# Patient Record
Sex: MALE | Race: WHITE | NOT HISPANIC OR LATINO | Employment: UNEMPLOYED | RURAL
[De-identification: names, ages, dates, MRNs, and addresses within clinical notes are randomized per-mention and may not be internally consistent; named-entity substitution may affect disease eponyms.]

---

## 2020-11-04 ENCOUNTER — HISTORICAL (OUTPATIENT)
Dept: ADMINISTRATIVE | Facility: HOSPITAL | Age: 60
End: 2020-11-04

## 2021-03-30 PROBLEM — J44.9 COPD (CHRONIC OBSTRUCTIVE PULMONARY DISEASE): Status: ACTIVE | Noted: 2021-03-30

## 2021-03-30 PROBLEM — M19.90 DJD (DEGENERATIVE JOINT DISEASE): Status: ACTIVE | Noted: 2021-03-30

## 2021-03-30 PROBLEM — I10 HTN (HYPERTENSION): Status: ACTIVE | Noted: 2021-03-30

## 2021-09-08 RX ORDER — MELOXICAM 7.5 MG/1
7.5 TABLET ORAL DAILY
Qty: 90 TABLET | Refills: 3 | Status: SHIPPED | OUTPATIENT
Start: 2021-09-08 | End: 2022-07-10 | Stop reason: SDUPTHER

## 2021-09-08 RX ORDER — ALBUTEROL SULFATE 90 UG/1
2 AEROSOL, METERED RESPIRATORY (INHALATION) 4 TIMES DAILY
Qty: 18 G | Refills: 3 | Status: SHIPPED | OUTPATIENT
Start: 2021-09-08 | End: 2022-07-10 | Stop reason: SDUPTHER

## 2021-09-08 RX ORDER — BISOPROLOL FUMARATE AND HYDROCHLOROTHIAZIDE 5; 6.25 MG/1; MG/1
1 TABLET ORAL DAILY
Qty: 90 TABLET | Refills: 3 | Status: SHIPPED | OUTPATIENT
Start: 2021-09-08 | End: 2022-08-08 | Stop reason: SDUPTHER

## 2021-09-08 RX ORDER — CITALOPRAM 40 MG/1
40 TABLET, FILM COATED ORAL DAILY
Qty: 90 TABLET | Refills: 3 | Status: SHIPPED | OUTPATIENT
Start: 2021-09-08 | End: 2022-11-09

## 2021-09-08 RX ORDER — FLUTICASONE PROPIONATE 220 UG/1
2 AEROSOL, METERED RESPIRATORY (INHALATION) 2 TIMES DAILY
Qty: 12 G | Refills: 3 | Status: SHIPPED | OUTPATIENT
Start: 2021-09-08 | End: 2022-11-09

## 2021-09-08 RX ORDER — AMLODIPINE AND BENAZEPRIL HYDROCHLORIDE 5; 20 MG/1; MG/1
1 CAPSULE ORAL 2 TIMES DAILY
Qty: 180 CAPSULE | Refills: 3 | Status: SHIPPED | OUTPATIENT
Start: 2021-09-08 | End: 2022-08-08 | Stop reason: SDUPTHER

## 2021-09-08 RX ORDER — ASPIRIN 81 MG/1
81 TABLET ORAL DAILY
Qty: 90 TABLET | Refills: 3 | Status: SHIPPED | OUTPATIENT
Start: 2021-09-08 | End: 2022-07-10 | Stop reason: SDUPTHER

## 2021-09-08 RX ORDER — ALLOPURINOL 300 MG/1
300 TABLET ORAL DAILY
Qty: 90 TABLET | Refills: 3 | Status: SHIPPED | OUTPATIENT
Start: 2021-09-08 | End: 2022-07-10 | Stop reason: SDUPTHER

## 2022-07-11 RX ORDER — ALBUTEROL SULFATE 90 UG/1
2 AEROSOL, METERED RESPIRATORY (INHALATION) 4 TIMES DAILY
Qty: 18 G | Refills: 3 | Status: SHIPPED | OUTPATIENT
Start: 2022-07-11 | End: 2023-03-20 | Stop reason: SDUPTHER

## 2022-07-11 RX ORDER — ASPIRIN 81 MG/1
81 TABLET ORAL DAILY
Qty: 90 TABLET | Refills: 3 | Status: SHIPPED | OUTPATIENT
Start: 2022-07-11 | End: 2023-10-10 | Stop reason: SDUPTHER

## 2022-07-11 RX ORDER — MELOXICAM 7.5 MG/1
7.5 TABLET ORAL DAILY
Qty: 90 TABLET | Refills: 3 | Status: SHIPPED | OUTPATIENT
Start: 2022-07-11 | End: 2023-07-25 | Stop reason: SDUPTHER

## 2022-08-08 RX ORDER — AMLODIPINE AND BENAZEPRIL HYDROCHLORIDE 5; 20 MG/1; MG/1
1 CAPSULE ORAL 2 TIMES DAILY
Qty: 180 CAPSULE | Refills: 3 | Status: SHIPPED | OUTPATIENT
Start: 2022-08-08 | End: 2023-07-25 | Stop reason: SDUPTHER

## 2022-08-08 RX ORDER — BISOPROLOL FUMARATE AND HYDROCHLOROTHIAZIDE 5; 6.25 MG/1; MG/1
1 TABLET ORAL DAILY
Qty: 90 TABLET | Refills: 3 | Status: SHIPPED | OUTPATIENT
Start: 2022-08-08 | End: 2023-07-25 | Stop reason: SDUPTHER

## 2022-11-09 ENCOUNTER — OFFICE VISIT (OUTPATIENT)
Dept: PRIMARY CARE CLINIC | Facility: CLINIC | Age: 62
End: 2022-11-09
Payer: COMMERCIAL

## 2022-11-09 ENCOUNTER — TELEPHONE (OUTPATIENT)
Dept: PRIMARY CARE CLINIC | Facility: CLINIC | Age: 62
End: 2022-11-09
Payer: COMMERCIAL

## 2022-11-09 VITALS
SYSTOLIC BLOOD PRESSURE: 134 MMHG | DIASTOLIC BLOOD PRESSURE: 80 MMHG | RESPIRATION RATE: 18 BRPM | BODY MASS INDEX: 40.69 KG/M2 | OXYGEN SATURATION: 95 % | WEIGHT: 307 LBS | HEIGHT: 73 IN | HEART RATE: 56 BPM

## 2022-11-09 DIAGNOSIS — Z23 NEED FOR VACCINATION: ICD-10-CM

## 2022-11-09 DIAGNOSIS — Z12.11 COLON CANCER SCREENING: ICD-10-CM

## 2022-11-09 DIAGNOSIS — Z12.2 SCREENING FOR LUNG CANCER: ICD-10-CM

## 2022-11-09 DIAGNOSIS — Z00.00 ROUTINE GENERAL MEDICAL EXAMINATION AT A HEALTH CARE FACILITY: Primary | ICD-10-CM

## 2022-11-09 DIAGNOSIS — J44.9 CHRONIC OBSTRUCTIVE PULMONARY DISEASE, UNSPECIFIED COPD TYPE: ICD-10-CM

## 2022-11-09 DIAGNOSIS — I10 HTN (HYPERTENSION), BENIGN: Primary | ICD-10-CM

## 2022-11-09 DIAGNOSIS — Z12.5 PROSTATE CANCER SCREENING: ICD-10-CM

## 2022-11-09 DIAGNOSIS — I10 HYPERTENSION, UNSPECIFIED TYPE: ICD-10-CM

## 2022-11-09 PROCEDURE — 90715 TDAP VACCINE GREATER THAN OR EQUAL TO 7YO IM: ICD-10-PCS | Mod: ICN,,, | Performed by: FAMILY MEDICINE

## 2022-11-09 PROCEDURE — 90472 IMMUNIZATION ADMIN EACH ADD: CPT | Mod: ICN,,, | Performed by: FAMILY MEDICINE

## 2022-11-09 PROCEDURE — 99396 PREV VISIT EST AGE 40-64: CPT | Mod: 25,ICN,, | Performed by: FAMILY MEDICINE

## 2022-11-09 PROCEDURE — 90686 IIV4 VACC NO PRSV 0.5 ML IM: CPT | Mod: ICN,,, | Performed by: FAMILY MEDICINE

## 2022-11-09 PROCEDURE — 90472 PNEUMOCOCCAL CONJUGATE VACCINE 20-VALENT: ICD-10-PCS | Mod: ICN,,, | Performed by: FAMILY MEDICINE

## 2022-11-09 PROCEDURE — 90471 IMMUNIZATION ADMIN: CPT | Mod: ICN,,, | Performed by: FAMILY MEDICINE

## 2022-11-09 PROCEDURE — 3079F PR MOST RECENT DIASTOLIC BLOOD PRESSURE 80-89 MM HG: ICD-10-PCS | Mod: ICN,,, | Performed by: FAMILY MEDICINE

## 2022-11-09 PROCEDURE — 4010F PR ACE/ARB THEARPY RXD/TAKEN: ICD-10-PCS | Mod: ICN,,, | Performed by: FAMILY MEDICINE

## 2022-11-09 PROCEDURE — 90677 PNEUMOCOCCAL CONJUGATE VACCINE 20-VALENT: ICD-10-PCS | Mod: ICN,,, | Performed by: FAMILY MEDICINE

## 2022-11-09 PROCEDURE — 3008F BODY MASS INDEX DOCD: CPT | Mod: ICN,,, | Performed by: FAMILY MEDICINE

## 2022-11-09 PROCEDURE — 3075F SYST BP GE 130 - 139MM HG: CPT | Mod: ICN,,, | Performed by: FAMILY MEDICINE

## 2022-11-09 PROCEDURE — 90471 FLU VACCINE (QUAD) GREATER THAN OR EQUAL TO 3YO PRESERVATIVE FREE IM: ICD-10-PCS | Mod: ICN,,, | Performed by: FAMILY MEDICINE

## 2022-11-09 PROCEDURE — 90715 TDAP VACCINE 7 YRS/> IM: CPT | Mod: ICN,,, | Performed by: FAMILY MEDICINE

## 2022-11-09 PROCEDURE — 99396 FLU VACCINE (QUAD) GREATER THAN OR EQUAL TO 3YO PRESERVATIVE FREE IM: ICD-10-PCS | Mod: 25,ICN,, | Performed by: FAMILY MEDICINE

## 2022-11-09 PROCEDURE — 1159F PR MEDICATION LIST DOCUMENTED IN MEDICAL RECORD: ICD-10-PCS | Mod: ICN,,, | Performed by: FAMILY MEDICINE

## 2022-11-09 PROCEDURE — 1159F MED LIST DOCD IN RCRD: CPT | Mod: ICN,,, | Performed by: FAMILY MEDICINE

## 2022-11-09 PROCEDURE — 3075F PR MOST RECENT SYSTOLIC BLOOD PRESS GE 130-139MM HG: ICD-10-PCS | Mod: ICN,,, | Performed by: FAMILY MEDICINE

## 2022-11-09 PROCEDURE — 90677 PCV20 VACCINE IM: CPT | Mod: ICN,,, | Performed by: FAMILY MEDICINE

## 2022-11-09 PROCEDURE — 4010F ACE/ARB THERAPY RXD/TAKEN: CPT | Mod: ICN,,, | Performed by: FAMILY MEDICINE

## 2022-11-09 PROCEDURE — 3079F DIAST BP 80-89 MM HG: CPT | Mod: ICN,,, | Performed by: FAMILY MEDICINE

## 2022-11-09 PROCEDURE — 90686 PR FLU VACCINE, QIIV4, NO PRSV, 0.5 ML, IM: ICD-10-PCS | Mod: ICN,,, | Performed by: FAMILY MEDICINE

## 2022-11-09 PROCEDURE — 3008F PR BODY MASS INDEX (BMI) DOCUMENTED: ICD-10-PCS | Mod: ICN,,, | Performed by: FAMILY MEDICINE

## 2022-11-09 RX ORDER — CETIRIZINE HYDROCHLORIDE 10 MG/1
10 TABLET ORAL DAILY
COMMUNITY

## 2022-11-09 RX ORDER — CHOLECALCIFEROL (VITAMIN D3) 25 MCG
1000 TABLET ORAL DAILY
COMMUNITY

## 2022-11-09 NOTE — PROGRESS NOTES
Subjective:      Patient ID: Camilo Olivera is a 61 y.o. male.    Chief Complaint: Follow-up    Camilo Olivera a 61 y.o. male presents for follow up on all regular problems which are reviewed and discussed.     Problem List Items Addressed This Visit          Pulmonary    COPD (chronic obstructive pulmonary disease)       Cardiac/Vascular    HTN (hypertension)       ID    Need for vaccination       Other    Routine general medical examination at a health care facility - Primary       Past Medical History:  History reviewed. No pertinent past medical history.  Past Surgical History:   Procedure Laterality Date    HERNIA REPAIR  1990's    Umbilical     Review of patient's allergies indicates:  No Known Allergies  Current Outpatient Medications on File Prior to Visit   Medication Sig Dispense Refill    albuterol (PROVENTIL/VENTOLIN HFA) 90 mcg/actuation inhaler Inhale 2 puffs into the lungs 4 (four) times daily. Rescue 18 g 3    allopurinoL (ZYLOPRIM) 300 MG tablet Take 1 tablet (300 mg total) by mouth once daily. 90 tablet 3    amlodipine-benazepril 5-20 mg (LOTREL) 5-20 mg per capsule Take 1 capsule by mouth 2 (two) times a day. 180 capsule 3    aspirin (ECOTRIN) 81 MG EC tablet Take 1 tablet (81 mg total) by mouth once daily. 90 tablet 3    beta-carotene,A,-vits C,E/mins (OCUVITE ORAL) Take by mouth.      bisoprolol-hydrochlorothiazide 5-6.25 mg (ZIAC) 5-6.25 mg Tab Take 1 tablet by mouth once daily. 90 tablet 3    cetirizine (ZYRTEC) 10 MG tablet Take 10 mg by mouth once daily.      folic acid/multivit-min/lutein (CENTRUM SILVER ORAL) Take 1 tablet by mouth Daily.      meloxicam (MOBIC) 7.5 MG tablet Take 1 tablet (7.5 mg total) by mouth once daily. 90 tablet 3    vitamin D (VITAMIN D3) 1000 units Tab Take 1,000 Units by mouth once daily.      [DISCONTINUED] allopurinoL (ZYLOPRIM) 300 MG tablet Take 300 mg by mouth once daily.      [DISCONTINUED] citalopram (CELEXA) 40 MG tablet Take 1 tablet (40 mg total)  by mouth once daily. 90 tablet 3    [DISCONTINUED] fluticasone propionate (FLOVENT HFA) 220 mcg/actuation inhaler Inhale 2 puffs into the lungs 2 (two) times daily. Controller 12 g 3     No current facility-administered medications on file prior to visit.     Social History     Socioeconomic History    Marital status:    Tobacco Use    Smoking status: Former     Packs/day: 1.00     Years: 40.00     Pack years: 40.00     Types: Cigarettes, Vaping with nicotine     Start date: 1980     Quit date: 9/10/2022     Years since quittin.1    Smokeless tobacco: Never    Tobacco comments:     I  like  smoking   Substance and Sexual Activity    Alcohol use: Yes     Alcohol/week: 12.0 standard drinks     Types: 12 Drinks containing 0.5 oz of alcohol per week    Drug use: Never    Sexual activity: Yes     Partners: Female     Birth control/protection: None     Family History   Problem Relation Age of Onset    Hypertension Mother     Cancer Maternal Grandmother         Breast    Arthritis Maternal Aunt         Hands    COPD Paternal Uncle     Heart disease Brother         Heart valve    Vision loss Paternal Aunt         Glocoma       Review of Systems   Constitutional:  Negative for activity change and unexpected weight change.   HENT:  Negative for hearing loss, rhinorrhea and trouble swallowing.    Eyes:  Negative for discharge and visual disturbance.   Respiratory:  Positive for wheezing. Negative for chest tightness.    Cardiovascular:  Negative for chest pain and palpitations.   Gastrointestinal:  Negative for blood in stool, constipation, diarrhea and vomiting.   Endocrine: Negative for polydipsia and polyuria.   Genitourinary:  Positive for urgency. Negative for difficulty urinating and hematuria.   Musculoskeletal:  Positive for arthralgias, joint swelling and neck pain.   Neurological:  Negative for weakness and headaches.   Psychiatric/Behavioral:  Negative for confusion and dysphoric mood.      Objective:  "    /80 (BP Location: Left arm, Patient Position: Sitting, BP Method: Large (Manual))   Pulse (!) 56   Resp 18   Ht 6' 1" (1.854 m)   Wt (!) 139.3 kg (307 lb)   SpO2 95%   BMI 40.50 kg/m²     Physical Exam  Constitutional:       Appearance: Normal appearance. He is obese.   HENT:      Head: Normocephalic and atraumatic.      Right Ear: External ear normal.      Left Ear: External ear normal.      Nose: Nose normal.      Mouth/Throat:      Mouth: Mucous membranes are moist.      Pharynx: Oropharynx is clear.   Eyes:      Pupils: Pupils are equal, round, and reactive to light.   Cardiovascular:      Rate and Rhythm: Normal rate and regular rhythm.      Heart sounds: Normal heart sounds.   Pulmonary:      Effort: Pulmonary effort is normal. No respiratory distress.      Breath sounds: Normal breath sounds. No stridor. No rhonchi or rales.   Abdominal:      Palpations: Abdomen is soft.   Musculoskeletal:         General: Normal range of motion.      Cervical back: Normal range of motion and neck supple.   Skin:     General: Skin is warm and dry.   Neurological:      General: No focal deficit present.      Mental Status: He is alert.   Psychiatric:         Mood and Affect: Mood normal.         Behavior: Behavior normal.         Thought Content: Thought content normal.         Judgment: Judgment normal.       1. Routine general medical examination at a health care facility    2. Colon cancer screening    3. Screening for lung cancer    4. Chronic obstructive pulmonary disease, unspecified COPD type    5. Hypertension, unspecified type    6. Need for vaccination        Plan:     Problem List Items Addressed This Visit          Pulmonary    COPD (chronic obstructive pulmonary disease)       Cardiac/Vascular    HTN (hypertension)       ID    Need for vaccination       Other    Routine general medical examination at a health care facility - Primary     No follow-ups on file.  Screens set up shots fu etc    I am " having Camilo Olivera maintain his albuterol, aspirin, meloxicam, allopurinoL, amlodipine-benazepril 5-20 mg, bisoprolol-hydrochlorothiazide 5-6.25 mg, vitamin D, folic acid/multivit-min/lutein (CENTRUM SILVER ORAL), (beta-carotene,A,-vits C,E/mins (OCUVITE ORAL)), and cetirizine.    Camilo was seen today for follow-up.    Diagnoses and all orders for this visit:    Routine general medical examination at a health care facility    Colon cancer screening  -     Cologuard Screening (Multitarget Stool DNA); Future  -     Cologuard Screening (Multitarget Stool DNA)    Screening for lung cancer  -     CT Chest Lung Screening Low Dose; Future    Chronic obstructive pulmonary disease, unspecified COPD type    Hypertension, unspecified type    Need for vaccination  -     Influenza - Quadrivalent *Preferred* (6 months+) (PF)  -     (In Office Administered) Pneumococcal Conjugate Vaccine (20 Valent) (IM)  -     (In Office Administered) Tdap Vaccine       [unfilled]  Orders Placed This Encounter   Procedures    Cologuard Screening (Multitarget Stool DNA)     Standing Status:   Future     Number of Occurrences:   1     Standing Expiration Date:   1/8/2024    CT Chest Lung Screening Low Dose     Standing Status:   Future     Standing Expiration Date:   11/9/2023     Order Specific Question:   Is there documentation of shared decision making for this lung screening exam?     Answer:   Yes     Order Specific Question:   Is the patient a current smoker?     Answer:   Yes     Order Specific Question:   Is the patient a current or former smoker who quit within the past 15 years?     Answer:   Yes     Order Specific Question:   Is the patient between the age 50-80 years old?     Answer:   Yes     Order Specific Question:   Has the patient smoked 20 or more packs of cigarettes/year?     Answer:   Yes     Order Specific Question:   Does the patient show any signs or symptoms of lung cancer?     Answer:   Yes     Order Specific  Question:   May the Radiologist modify the order per protocol to meet the clinical needs of the patient?     Answer:   Yes     Order Specific Question:   Is this a low dose screening chest CT?     Answer:   Yes     Order Specific Question:   Is this a low dose screening chest CT?     Answer:   Yes    Influenza - Quadrivalent *Preferred* (6 months+) (PF)    (In Office Administered) Pneumococcal Conjugate Vaccine (20 Valent) (IM)    (In Office Administered) Tdap Vaccine

## 2022-11-09 NOTE — TELEPHONE ENCOUNTER
----- Message from Alex Melendez III, DO sent at 11/9/2022  3:36 PM CST -----  Labs good except good chol. Is low. Exercise lose wt

## 2022-11-16 ENCOUNTER — HOSPITAL ENCOUNTER (OUTPATIENT)
Dept: RADIOLOGY | Facility: HOSPITAL | Age: 62
Discharge: HOME | End: 2022-11-16
Attending: FAMILY MEDICINE
Payer: COMMERCIAL

## 2022-11-16 ENCOUNTER — OFFICE VISIT (OUTPATIENT)
Dept: PRIMARY CARE CLINIC | Facility: CLINIC | Age: 62
End: 2022-11-16
Payer: COMMERCIAL

## 2022-11-16 VITALS — WEIGHT: 307 LBS | HEIGHT: 73 IN | BODY MASS INDEX: 40.69 KG/M2

## 2022-11-16 VITALS
HEART RATE: 58 BPM | BODY MASS INDEX: 40.56 KG/M2 | HEIGHT: 73 IN | RESPIRATION RATE: 18 BRPM | OXYGEN SATURATION: 95 % | WEIGHT: 306 LBS | DIASTOLIC BLOOD PRESSURE: 74 MMHG | SYSTOLIC BLOOD PRESSURE: 122 MMHG

## 2022-11-16 DIAGNOSIS — Z12.2 SCREENING FOR LUNG CANCER: ICD-10-CM

## 2022-11-16 DIAGNOSIS — J44.9 CHRONIC OBSTRUCTIVE PULMONARY DISEASE, UNSPECIFIED COPD TYPE: Primary | ICD-10-CM

## 2022-11-16 DIAGNOSIS — I10 HYPERTENSION, UNSPECIFIED TYPE: ICD-10-CM

## 2022-11-16 DIAGNOSIS — M19.90 OSTEOARTHRITIS, UNSPECIFIED OSTEOARTHRITIS TYPE, UNSPECIFIED SITE: ICD-10-CM

## 2022-11-16 PROCEDURE — 3074F PR MOST RECENT SYSTOLIC BLOOD PRESSURE < 130 MM HG: ICD-10-PCS | Mod: ,,, | Performed by: FAMILY MEDICINE

## 2022-11-16 PROCEDURE — 4010F ACE/ARB THERAPY RXD/TAKEN: CPT | Mod: ,,, | Performed by: FAMILY MEDICINE

## 2022-11-16 PROCEDURE — 3008F BODY MASS INDEX DOCD: CPT | Mod: ,,, | Performed by: FAMILY MEDICINE

## 2022-11-16 PROCEDURE — 71271 CT CHEST LUNG SCREENING LOW DOSE: ICD-10-PCS | Mod: 26,,, | Performed by: RADIOLOGY

## 2022-11-16 PROCEDURE — 3008F PR BODY MASS INDEX (BMI) DOCUMENTED: ICD-10-PCS | Mod: ,,, | Performed by: FAMILY MEDICINE

## 2022-11-16 PROCEDURE — 71271 CT THORAX LUNG CANCER SCR C-: CPT | Mod: 26,,, | Performed by: RADIOLOGY

## 2022-11-16 PROCEDURE — 99214 PR OFFICE/OUTPT VISIT, EST, LEVL IV, 30-39 MIN: ICD-10-PCS | Mod: 25,,, | Performed by: FAMILY MEDICINE

## 2022-11-16 PROCEDURE — 1159F MED LIST DOCD IN RCRD: CPT | Mod: ,,, | Performed by: FAMILY MEDICINE

## 2022-11-16 PROCEDURE — 1159F PR MEDICATION LIST DOCUMENTED IN MEDICAL RECORD: ICD-10-PCS | Mod: ,,, | Performed by: FAMILY MEDICINE

## 2022-11-16 PROCEDURE — 4010F PR ACE/ARB THEARPY RXD/TAKEN: ICD-10-PCS | Mod: ,,, | Performed by: FAMILY MEDICINE

## 2022-11-16 PROCEDURE — 3078F PR MOST RECENT DIASTOLIC BLOOD PRESSURE < 80 MM HG: ICD-10-PCS | Mod: ,,, | Performed by: FAMILY MEDICINE

## 2022-11-16 PROCEDURE — 3078F DIAST BP <80 MM HG: CPT | Mod: ,,, | Performed by: FAMILY MEDICINE

## 2022-11-16 PROCEDURE — 96372 PR INJECTION,THERAP/PROPH/DIAG2ST, IM OR SUBCUT: ICD-10-PCS | Mod: ,,, | Performed by: FAMILY MEDICINE

## 2022-11-16 PROCEDURE — 3074F SYST BP LT 130 MM HG: CPT | Mod: ,,, | Performed by: FAMILY MEDICINE

## 2022-11-16 PROCEDURE — 96372 THER/PROPH/DIAG INJ SC/IM: CPT | Mod: ,,, | Performed by: FAMILY MEDICINE

## 2022-11-16 PROCEDURE — 99214 OFFICE O/P EST MOD 30 MIN: CPT | Mod: 25,,, | Performed by: FAMILY MEDICINE

## 2022-11-16 PROCEDURE — 71271 CT THORAX LUNG CANCER SCR C-: CPT | Mod: TC

## 2022-11-16 RX ORDER — DEXAMETHASONE SODIUM PHOSPHATE 4 MG/ML
4 INJECTION, SOLUTION INTRA-ARTICULAR; INTRALESIONAL; INTRAMUSCULAR; INTRAVENOUS; SOFT TISSUE
Status: COMPLETED | OUTPATIENT
Start: 2022-11-16 | End: 2022-11-16

## 2022-11-16 RX ORDER — METHYLPREDNISOLONE ACETATE 80 MG/ML
80 INJECTION, SUSPENSION INTRA-ARTICULAR; INTRALESIONAL; INTRAMUSCULAR; SOFT TISSUE
Status: COMPLETED | OUTPATIENT
Start: 2022-11-16 | End: 2022-11-16

## 2022-11-16 RX ADMIN — DEXAMETHASONE SODIUM PHOSPHATE 4 MG: 4 INJECTION, SOLUTION INTRA-ARTICULAR; INTRALESIONAL; INTRAMUSCULAR; INTRAVENOUS; SOFT TISSUE at 02:11

## 2022-11-16 RX ADMIN — METHYLPREDNISOLONE ACETATE 80 MG: 80 INJECTION, SUSPENSION INTRA-ARTICULAR; INTRALESIONAL; INTRAMUSCULAR; SOFT TISSUE at 02:11

## 2022-11-16 NOTE — PROGRESS NOTES
Subjective:      Patient ID: Camilo Olivera is a 61 y.o. male.    Chief Complaint: CT results    Camilo Olivera a 61 y.o. male presents for follow up on all regular problems which are reviewed and discussed.   Shoulder pain-right-declines imaging  Problem List Items Addressed This Visit          Pulmonary    COPD (chronic obstructive pulmonary disease) - Primary       Cardiac/Vascular    HTN (hypertension)       Orthopedic    DJD (degenerative joint disease)       Past Medical History:  History reviewed. No pertinent past medical history.  Past Surgical History:   Procedure Laterality Date    HERNIA REPAIR  1990's    Umbilical     Review of patient's allergies indicates:  No Known Allergies  Current Outpatient Medications on File Prior to Visit   Medication Sig Dispense Refill    albuterol (PROVENTIL/VENTOLIN HFA) 90 mcg/actuation inhaler Inhale 2 puffs into the lungs 4 (four) times daily. Rescue 18 g 3    allopurinoL (ZYLOPRIM) 300 MG tablet Take 1 tablet (300 mg total) by mouth once daily. 90 tablet 3    amlodipine-benazepril 5-20 mg (LOTREL) 5-20 mg per capsule Take 1 capsule by mouth 2 (two) times a day. 180 capsule 3    aspirin (ECOTRIN) 81 MG EC tablet Take 1 tablet (81 mg total) by mouth once daily. 90 tablet 3    beta-carotene,A,-vits C,E/mins (OCUVITE ORAL) Take by mouth.      bisoprolol-hydrochlorothiazide 5-6.25 mg (ZIAC) 5-6.25 mg Tab Take 1 tablet by mouth once daily. 90 tablet 3    cetirizine (ZYRTEC) 10 MG tablet Take 10 mg by mouth once daily.      folic acid/multivit-min/lutein (CENTRUM SILVER ORAL) Take 1 tablet by mouth Daily.      meloxicam (MOBIC) 7.5 MG tablet Take 1 tablet (7.5 mg total) by mouth once daily. 90 tablet 3    vitamin D (VITAMIN D3) 1000 units Tab Take 1,000 Units by mouth once daily.       No current facility-administered medications on file prior to visit.     Social History     Socioeconomic History    Marital status:    Tobacco Use    Smoking status: Former      "Packs/day: 1.00     Years: 40.00     Pack years: 40.00     Types: Cigarettes, Vaping with nicotine     Start date: 1980     Quit date: 9/10/2022     Years since quittin.1    Smokeless tobacco: Never    Tobacco comments:     I  like  smoking   Substance and Sexual Activity    Alcohol use: Yes     Alcohol/week: 12.0 standard drinks     Types: 12 Drinks containing 0.5 oz of alcohol per week    Drug use: Never    Sexual activity: Yes     Partners: Female     Birth control/protection: None     Family History   Problem Relation Age of Onset    Hypertension Mother     Cancer Maternal Grandmother         Breast    Arthritis Maternal Aunt         Hands    COPD Paternal Uncle     Heart disease Brother         Heart valve    Vision loss Paternal Aunt         Glocoma       Review of Systems   Constitutional: Negative.    HENT:  Negative for congestion, ear pain, nosebleeds and trouble swallowing.    Eyes:  Negative for pain and itching.   Respiratory:  Negative for chest tightness.    Cardiovascular:  Negative for chest pain.   Gastrointestinal:  Negative for abdominal distention.   Endocrine: Negative for cold intolerance and heat intolerance.   Genitourinary:  Negative for difficulty urinating.   Musculoskeletal:  Positive for arthralgias.   Neurological:  Negative for dizziness.     Objective:     /74 (BP Location: Right arm, Patient Position: Sitting, BP Method: Large (Manual))   Pulse (!) 58   Resp 18   Ht 6' 1" (1.854 m)   Wt (!) 138.8 kg (306 lb)   SpO2 95%   BMI 40.37 kg/m²     Physical Exam  Constitutional:       Appearance: Normal appearance. He is obese. He is not ill-appearing or diaphoretic.   HENT:      Head: Normocephalic and atraumatic.      Right Ear: External ear normal.      Left Ear: External ear normal. There is no impacted cerumen.      Nose: Nose normal.      Mouth/Throat:      Mouth: Mucous membranes are moist.      Pharynx: Oropharynx is clear.   Eyes:      Pupils: Pupils are equal, " round, and reactive to light.   Cardiovascular:      Rate and Rhythm: Normal rate and regular rhythm.      Heart sounds: Normal heart sounds.   Pulmonary:      Effort: Pulmonary effort is normal.      Breath sounds: Normal breath sounds.   Abdominal:      General: There is no distension.      Palpations: Abdomen is soft.      Tenderness: There is no abdominal tenderness.   Musculoskeletal:         General: Normal range of motion.      Cervical back: Normal range of motion and neck supple.   Skin:     General: Skin is warm and dry.   Neurological:      General: No focal deficit present.      Mental Status: He is alert.      Cranial Nerves: No cranial nerve deficit.      Motor: No weakness.   Psychiatric:         Mood and Affect: Mood normal.         Behavior: Behavior normal.         Thought Content: Thought content normal.         Judgment: Judgment normal.       1. Chronic obstructive pulmonary disease, unspecified COPD type    2. Hypertension, unspecified type    3. Osteoarthritis, unspecified osteoarthritis type, unspecified site        Plan:     Problem List Items Addressed This Visit          Pulmonary    COPD (chronic obstructive pulmonary disease) - Primary       Cardiac/Vascular    HTN (hypertension)       Orthopedic    DJD (degenerative joint disease)     No follow-ups on file.    6m fu  I am having Camilo Olivera maintain his albuterol, aspirin, meloxicam, allopurinoL, amlodipine-benazepril 5-20 mg, bisoprolol-hydrochlorothiazide 5-6.25 mg, vitamin D, folic acid/multivit-min/lutein (CENTRUM SILVER ORAL), (beta-carotene,A,-vits C,E/mins (OCUVITE ORAL)), and cetirizine. We will continue to administer dexAMETHasone and methylPREDNISolone acetate.    Camilo was seen today for ct results.    Diagnoses and all orders for this visit:    Chronic obstructive pulmonary disease, unspecified COPD type    Hypertension, unspecified type    Osteoarthritis, unspecified osteoarthritis type, unspecified site    Other  orders  -     dexAMETHasone injection 4 mg  -     methylPREDNISolone acetate injection 80 mg    Medications Ordered This Encounter   Medications    dexAMETHasone injection 4 mg    methylPREDNISolone acetate injection 80 mg     [unfilled]  No orders of the defined types were placed in this encounter.

## 2023-02-13 PROBLEM — Z00.00 ROUTINE GENERAL MEDICAL EXAMINATION AT A HEALTH CARE FACILITY: Status: RESOLVED | Noted: 2022-11-09 | Resolved: 2023-02-13

## 2023-03-20 RX ORDER — ALBUTEROL SULFATE 90 UG/1
2 AEROSOL, METERED RESPIRATORY (INHALATION) 4 TIMES DAILY
Qty: 18 G | Refills: 3 | Status: SHIPPED | OUTPATIENT
Start: 2023-03-20 | End: 2023-07-25 | Stop reason: SDUPTHER

## 2023-07-25 RX ORDER — MELOXICAM 7.5 MG/1
7.5 TABLET ORAL DAILY
Qty: 90 TABLET | Refills: 3 | Status: SHIPPED | OUTPATIENT
Start: 2023-07-25

## 2023-07-25 RX ORDER — AMLODIPINE AND BENAZEPRIL HYDROCHLORIDE 5; 20 MG/1; MG/1
1 CAPSULE ORAL 2 TIMES DAILY
Qty: 180 CAPSULE | Refills: 3 | Status: SHIPPED | OUTPATIENT
Start: 2023-07-25 | End: 2023-10-26

## 2023-07-25 RX ORDER — BISOPROLOL FUMARATE AND HYDROCHLOROTHIAZIDE 5; 6.25 MG/1; MG/1
1 TABLET ORAL DAILY
Qty: 90 TABLET | Refills: 3 | Status: SHIPPED | OUTPATIENT
Start: 2023-07-25

## 2023-07-25 RX ORDER — ALLOPURINOL 300 MG/1
300 TABLET ORAL DAILY
Qty: 90 TABLET | Refills: 3 | Status: SHIPPED | OUTPATIENT
Start: 2023-07-25

## 2023-07-25 RX ORDER — ALBUTEROL SULFATE 90 UG/1
2 AEROSOL, METERED RESPIRATORY (INHALATION) 4 TIMES DAILY
Qty: 18 G | Refills: 3 | Status: SHIPPED | OUTPATIENT
Start: 2023-07-25 | End: 2023-10-11 | Stop reason: SDUPTHER

## 2023-09-26 ENCOUNTER — CLINICAL SUPPORT (OUTPATIENT)
Dept: CARDIOLOGY | Facility: CLINIC | Age: 63
End: 2023-09-26
Attending: FAMILY MEDICINE
Payer: COMMERCIAL

## 2023-09-26 ENCOUNTER — OFFICE VISIT (OUTPATIENT)
Dept: PRIMARY CARE CLINIC | Facility: CLINIC | Age: 63
End: 2023-09-26
Payer: COMMERCIAL

## 2023-09-26 VITALS
HEIGHT: 73 IN | RESPIRATION RATE: 18 BRPM | OXYGEN SATURATION: 95 % | BODY MASS INDEX: 39.16 KG/M2 | SYSTOLIC BLOOD PRESSURE: 130 MMHG | WEIGHT: 295.5 LBS | HEART RATE: 61 BPM | DIASTOLIC BLOOD PRESSURE: 80 MMHG

## 2023-09-26 DIAGNOSIS — J44.9 CHRONIC OBSTRUCTIVE PULMONARY DISEASE, UNSPECIFIED COPD TYPE: ICD-10-CM

## 2023-09-26 DIAGNOSIS — R94.31 ABNORMAL EKG: Primary | ICD-10-CM

## 2023-09-26 DIAGNOSIS — M19.90 OSTEOARTHRITIS, UNSPECIFIED OSTEOARTHRITIS TYPE, UNSPECIFIED SITE: ICD-10-CM

## 2023-09-26 DIAGNOSIS — R94.31 ABNORMAL EKG: ICD-10-CM

## 2023-09-26 DIAGNOSIS — I10 HYPERTENSION, UNSPECIFIED TYPE: ICD-10-CM

## 2023-09-26 PROCEDURE — 3075F PR MOST RECENT SYSTOLIC BLOOD PRESS GE 130-139MM HG: ICD-10-PCS | Mod: ,,, | Performed by: FAMILY MEDICINE

## 2023-09-26 PROCEDURE — 93010 ELECTROCARDIOGRAM REPORT: CPT | Mod: S$PBB,,, | Performed by: INTERNAL MEDICINE

## 2023-09-26 PROCEDURE — 1159F PR MEDICATION LIST DOCUMENTED IN MEDICAL RECORD: ICD-10-PCS | Mod: ,,, | Performed by: FAMILY MEDICINE

## 2023-09-26 PROCEDURE — 93010 EKG 12-LEAD: ICD-10-PCS | Mod: S$PBB,,, | Performed by: INTERNAL MEDICINE

## 2023-09-26 PROCEDURE — 4010F PR ACE/ARB THEARPY RXD/TAKEN: ICD-10-PCS | Mod: ,,, | Performed by: FAMILY MEDICINE

## 2023-09-26 PROCEDURE — 3079F DIAST BP 80-89 MM HG: CPT | Mod: ,,, | Performed by: FAMILY MEDICINE

## 2023-09-26 PROCEDURE — 3008F BODY MASS INDEX DOCD: CPT | Mod: ,,, | Performed by: FAMILY MEDICINE

## 2023-09-26 PROCEDURE — 3079F PR MOST RECENT DIASTOLIC BLOOD PRESSURE 80-89 MM HG: ICD-10-PCS | Mod: ,,, | Performed by: FAMILY MEDICINE

## 2023-09-26 PROCEDURE — 99215 PR OFFICE/OUTPT VISIT, EST, LEVL V, 40-54 MIN: ICD-10-PCS | Mod: ,,, | Performed by: FAMILY MEDICINE

## 2023-09-26 PROCEDURE — 3008F PR BODY MASS INDEX (BMI) DOCUMENTED: ICD-10-PCS | Mod: ,,, | Performed by: FAMILY MEDICINE

## 2023-09-26 PROCEDURE — 99212 OFFICE O/P EST SF 10 MIN: CPT | Mod: PBBFAC

## 2023-09-26 PROCEDURE — 3075F SYST BP GE 130 - 139MM HG: CPT | Mod: ,,, | Performed by: FAMILY MEDICINE

## 2023-09-26 PROCEDURE — 93005 ELECTROCARDIOGRAM TRACING: CPT | Mod: PBBFAC | Performed by: INTERNAL MEDICINE

## 2023-09-26 PROCEDURE — 99215 OFFICE O/P EST HI 40 MIN: CPT | Mod: ,,, | Performed by: FAMILY MEDICINE

## 2023-09-26 PROCEDURE — 1159F MED LIST DOCD IN RCRD: CPT | Mod: ,,, | Performed by: FAMILY MEDICINE

## 2023-09-26 PROCEDURE — 4010F ACE/ARB THERAPY RXD/TAKEN: CPT | Mod: ,,, | Performed by: FAMILY MEDICINE

## 2023-09-26 NOTE — PROGRESS NOTES
Subjective:      Patient ID: Camilo Olivera is a 62 y.o. male.    Chief Complaint: Shortness of Breath and irregular ekg (Test done in [place] for a job exam )    Camilo Olivera a 62 y.o. male presents for follow up on all regular problems which are reviewed and discussed.   No copy of abn. ekg  Problem List Items Addressed This Visit          Pulmonary    COPD (chronic obstructive pulmonary disease)       Cardiac/Vascular    HTN (hypertension)    Abnormal EKG - Primary    Relevant Orders    Troponin I    NT-Pro Natriuretic Peptide    EKG 12-lead       Orthopedic    DJD (degenerative joint disease)       Past Medical History:  No past medical history on file.  Past Surgical History:   Procedure Laterality Date    HERNIA REPAIR  1990's    Umbilical     Review of patient's allergies indicates:  No Known Allergies  Current Outpatient Medications on File Prior to Visit   Medication Sig Dispense Refill    albuterol (PROVENTIL/VENTOLIN HFA) 90 mcg/actuation inhaler Inhale 2 puffs into the lungs 4 (four) times daily. Rescue 18 g 3    allopurinoL (ZYLOPRIM) 300 MG tablet Take 1 tablet (300 mg total) by mouth once daily. 90 tablet 3    amlodipine-benazepril 5-20 mg (LOTREL) 5-20 mg per capsule Take 1 capsule by mouth 2 (two) times a day. 180 capsule 3    aspirin (ECOTRIN) 81 MG EC tablet Take 1 tablet (81 mg total) by mouth once daily. 90 tablet 3    beta-carotene,A,-vits C,E/mins (OCUVITE ORAL) Take by mouth.      bisoprolol-hydrochlorothiazide 5-6.25 mg (ZIAC) 5-6.25 mg Tab Take 1 tablet by mouth once daily. 90 tablet 3    cetirizine (ZYRTEC) 10 MG tablet Take 10 mg by mouth once daily.      folic acid/multivit-min/lutein (CENTRUM SILVER ORAL) Take 1 tablet by mouth Daily.      meloxicam (MOBIC) 7.5 MG tablet Take 1 tablet (7.5 mg total) by mouth once daily. 90 tablet 3    vitamin D (VITAMIN D3) 1000 units Tab Take 1,000 Units by mouth once daily.       No current facility-administered medications on file prior to  "visit.     Social History     Socioeconomic History    Marital status:    Tobacco Use    Smoking status: Former     Current packs/day: 0.00     Average packs/day: 1 pack/day for 42.7 years (42.7 ttl pk-yrs)     Types: Cigarettes, Vaping with nicotine     Start date: 1980     Quit date: 9/10/2022     Years since quittin.0    Smokeless tobacco: Never    Tobacco comments:     I  like  smoking   Substance and Sexual Activity    Alcohol use: Yes     Alcohol/week: 12.0 standard drinks of alcohol     Types: 12 Drinks containing 0.5 oz of alcohol per week    Drug use: Never    Sexual activity: Yes     Partners: Female     Birth control/protection: None     Family History   Problem Relation Age of Onset    Hypertension Mother     Cancer Maternal Grandmother         Breast    Arthritis Maternal Aunt         Hands    COPD Paternal Uncle     Heart disease Brother         Heart valve    Vision loss Paternal Aunt         Glocoma       Review of Systems   Constitutional: Negative.    HENT:  Negative for congestion, ear pain, nosebleeds and trouble swallowing.    Eyes:  Negative for pain and itching.   Respiratory:  Positive for shortness of breath. Negative for chest tightness.    Cardiovascular:  Negative for chest pain.   Gastrointestinal:  Negative for abdominal distention.   Endocrine: Negative for cold intolerance and heat intolerance.   Genitourinary:  Negative for difficulty urinating.   Musculoskeletal:  Negative for arthralgias.   Neurological:  Negative for dizziness.     Objective:     /80 (BP Location: Left arm, Patient Position: Sitting, BP Method: Large (Manual))   Pulse 61   Resp 18   Ht 6' 1" (1.854 m)   Wt 134 kg (295 lb 8 oz)   SpO2 95%   BMI 38.99 kg/m²     Physical Exam  Constitutional:       Appearance: Normal appearance. He is obese.   HENT:      Head: Normocephalic and atraumatic.      Right Ear: External ear normal.      Left Ear: External ear normal.      Nose: Nose normal.      " Mouth/Throat:      Mouth: Mucous membranes are moist.      Pharynx: Oropharynx is clear.   Eyes:      Pupils: Pupils are equal, round, and reactive to light.   Neck:      Vascular: No carotid bruit.   Cardiovascular:      Rate and Rhythm: Normal rate and regular rhythm.      Heart sounds: Normal heart sounds. No murmur heard.     No gallop.   Pulmonary:      Effort: Pulmonary effort is normal. No respiratory distress.      Breath sounds: Normal breath sounds. No wheezing or rales.   Abdominal:      Palpations: Abdomen is soft.   Musculoskeletal:         General: Normal range of motion.      Cervical back: Normal range of motion and neck supple.   Skin:     General: Skin is warm and dry.   Neurological:      General: No focal deficit present.      Mental Status: He is alert.   Psychiatric:         Mood and Affect: Mood normal.         Behavior: Behavior normal.         Thought Content: Thought content normal.         Judgment: Judgment normal.   Assessment:     1. Abnormal EKG    2. Chronic obstructive pulmonary disease, unspecified COPD type    3. Hypertension, unspecified type    4. Osteoarthritis, unspecified osteoarthritis type, unspecified site        Plan:     Problem List Items Addressed This Visit          Pulmonary    COPD (chronic obstructive pulmonary disease)       Cardiac/Vascular    HTN (hypertension)    Abnormal EKG - Primary    Relevant Orders    Troponin I    NT-Pro Natriuretic Peptide    EKG 12-lead       Orthopedic    DJD (degenerative joint disease)     No follow-ups on file.  Lab , ekg , cardio.  Stress? Echo?  Tsh abn. Ck free t4.  >55min in tx    I am having Camilo Olivera maintain his aspirin, vitamin D, folic acid/multivit-min/lutein (CENTRUM SILVER ORAL), (beta-carotene,A,-vits C,E/mins (OCUVITE ORAL)), cetirizine, meloxicam, allopurinoL, amlodipine-benazepril 5-20 mg, bisoprolol-hydrochlorothiazide 5-6.25 mg, and albuterol.    Camilo was seen today for shortness of breath and  irregular ekg.    Diagnoses and all orders for this visit:    Abnormal EKG  -     Troponin I; Future  -     NT-Pro Natriuretic Peptide; Future  -     EKG 12-lead; Future    Chronic obstructive pulmonary disease, unspecified COPD type    Hypertension, unspecified type    Osteoarthritis, unspecified osteoarthritis type, unspecified site         [unfilled]  Orders Placed This Encounter   Procedures    Troponin I     Standing Status:   Future     Standing Expiration Date:   11/24/2024    NT-Pro Natriuretic Peptide     Standing Status:   Future     Standing Expiration Date:   11/24/2024    EKG 12-lead     Standing Status:   Future     Number of Occurrences:   1     Standing Expiration Date:   9/26/2024

## 2023-09-27 DIAGNOSIS — R94.31 ABNORMAL ELECTROCARDIOGRAM (ECG) (EKG): Primary | ICD-10-CM

## 2023-10-09 ENCOUNTER — HOSPITAL ENCOUNTER (OUTPATIENT)
Dept: RADIOLOGY | Facility: HOSPITAL | Age: 63
Discharge: HOME | End: 2023-10-09
Attending: FAMILY MEDICINE
Payer: COMMERCIAL

## 2023-10-09 ENCOUNTER — HOSPITAL ENCOUNTER (OUTPATIENT)
Dept: CARDIOLOGY | Facility: HOSPITAL | Age: 63
Discharge: HOME | End: 2023-10-09
Attending: FAMILY MEDICINE
Payer: COMMERCIAL

## 2023-10-09 VITALS
SYSTOLIC BLOOD PRESSURE: 140 MMHG | BODY MASS INDEX: 38.92 KG/M2 | DIASTOLIC BLOOD PRESSURE: 77 MMHG | HEIGHT: 73 IN | HEART RATE: 61 BPM

## 2023-10-09 VITALS — BODY MASS INDEX: 39.1 KG/M2 | WEIGHT: 295 LBS | HEIGHT: 73 IN

## 2023-10-09 DIAGNOSIS — R94.31 ABNORMAL ELECTROCARDIOGRAM (ECG) (EKG): ICD-10-CM

## 2023-10-09 LAB
AORTIC ROOT ANNULUS: 3.46 CM
AV INDEX (PROSTH): 0.61
AV MEAN GRADIENT: 7 MMHG
AV PEAK GRADIENT: 14 MMHG
AV VALVE AREA BY VELOCITY RATIO: 2.22 CM²
AV VALVE AREA: 2.08 CM²
AV VELOCITY RATIO: 0.65
BSA FOR ECHO PROCEDURE: 2.63 M2
CV ECHO LV RWT: 0.31 CM
DOP CALC AO PEAK VEL: 1.87 M/S
DOP CALC AO VTI: 45.3 CM
DOP CALC LVOT AREA: 3.4 CM2
DOP CALC LVOT DIAMETER: 2.08 CM
DOP CALC LVOT PEAK VEL: 1.22 M/S
DOP CALC LVOT STROKE VOLUME: 94.42 CM3
DOP CALCLVOT PEAK VEL VTI: 27.8 CM
E WAVE DECELERATION TIME: 297.2 MSEC
E/A RATIO: 0.88
E/E' RATIO: 7.89 M/S
ECHO LV POSTERIOR WALL: 0.92 CM (ref 0.6–1.1)
EJECTION FRACTION: 60 %
FRACTIONAL SHORTENING: 35 % (ref 28–44)
GLOBAL LONGITUIDAL STRAIN: -16 %
INTERVENTRICULAR SEPTUM: 0.92 CM (ref 0.6–1.1)
IVC DIAMETER: 1.62 CM
LEFT INTERNAL DIMENSION IN SYSTOLE: 3.88 CM (ref 2.1–4)
LEFT VENTRICLE DIASTOLIC VOLUME INDEX: 69.15 ML/M2
LEFT VENTRICLE DIASTOLIC VOLUME: 175.64 ML
LEFT VENTRICLE MASS INDEX: 86 G/M2
LEFT VENTRICLE SYSTOLIC VOLUME INDEX: 25.6 ML/M2
LEFT VENTRICLE SYSTOLIC VOLUME: 65.15 ML
LEFT VENTRICULAR INTERNAL DIMENSION IN DIASTOLE: 5.94 CM (ref 3.5–6)
LEFT VENTRICULAR MASS: 218.02 G
LV LATERAL E/E' RATIO: 7.1 M/S
LV SEPTAL E/E' RATIO: 8.88 M/S
LVOT MG: 3.27 MMHG
LVOT MV: 0.85 CM/S
MV PEAK A VEL: 0.81 M/S
MV PEAK E VEL: 0.71 M/S
PISA TR MAX VEL: 1.69 M/S
PV PEAK GRADIENT: 5 MMHG
PV PEAK VELOCITY: 1.09 M/S
RA PRESSURE ESTIMATED: 3 MMHG
RIGHT ATRIAL AREA: 20 CM2
RIGHT VENTRICULAR END-DIASTOLIC DIMENSION: 3 CM
RIGHT VENTRICULAR LENGTH IN DIASTOLE (APICAL 4-CHAMBER VIEW): 7.39 CM
RV MID DIAMA: 3.02 CM
RV TB RVSP: 5 MMHG
TDI LATERAL: 0.1 M/S
TDI SEPTAL: 0.08 M/S
TDI: 0.09 M/S
TR MAX PG: 11 MMHG
TRICUSPID ANNULAR PLANE SYSTOLIC EXCURSION: 2.67 CM
TV REST PULMONARY ARTERY PRESSURE: 14 MMHG
Z-SCORE OF LEFT VENTRICULAR DIMENSION IN END DIASTOLE: -9.6
Z-SCORE OF LEFT VENTRICULAR DIMENSION IN END SYSTOLE: -6.79

## 2023-10-09 PROCEDURE — 78452 HT MUSCLE IMAGE SPECT MULT: CPT | Mod: TC

## 2023-10-09 PROCEDURE — 93018 CV STRESS TEST I&R ONLY: CPT | Mod: ,,, | Performed by: STUDENT IN AN ORGANIZED HEALTH CARE EDUCATION/TRAINING PROGRAM

## 2023-10-09 PROCEDURE — 93018 NUCLEAR STRESS TEST (CUPID ONLY): ICD-10-PCS | Mod: ,,, | Performed by: STUDENT IN AN ORGANIZED HEALTH CARE EDUCATION/TRAINING PROGRAM

## 2023-10-09 PROCEDURE — 93306 ECHO (CUPID ONLY): ICD-10-PCS | Mod: 26,,, | Performed by: STUDENT IN AN ORGANIZED HEALTH CARE EDUCATION/TRAINING PROGRAM

## 2023-10-09 PROCEDURE — 93017 CV STRESS TEST TRACING ONLY: CPT

## 2023-10-09 PROCEDURE — 93306 TTE W/DOPPLER COMPLETE: CPT | Mod: 26,,, | Performed by: STUDENT IN AN ORGANIZED HEALTH CARE EDUCATION/TRAINING PROGRAM

## 2023-10-09 PROCEDURE — 93016 CV STRESS TEST SUPVJ ONLY: CPT | Mod: ,,, | Performed by: NURSE PRACTITIONER

## 2023-10-09 PROCEDURE — 78452 HT MUSCLE IMAGE SPECT MULT: CPT | Mod: 26,,, | Performed by: STUDENT IN AN ORGANIZED HEALTH CARE EDUCATION/TRAINING PROGRAM

## 2023-10-09 PROCEDURE — 93016 NUCLEAR STRESS TEST (CUPID ONLY): ICD-10-PCS | Mod: ,,, | Performed by: NURSE PRACTITIONER

## 2023-10-09 PROCEDURE — 63600175 PHARM REV CODE 636 W HCPCS: Performed by: FAMILY MEDICINE

## 2023-10-09 PROCEDURE — A9500 TC99M SESTAMIBI: HCPCS

## 2023-10-09 PROCEDURE — 93306 TTE W/DOPPLER COMPLETE: CPT

## 2023-10-09 PROCEDURE — 78452 NM MYOCARDIAL PERFUSION SPECT MULTI PHARM: ICD-10-PCS | Mod: 26,,, | Performed by: STUDENT IN AN ORGANIZED HEALTH CARE EDUCATION/TRAINING PROGRAM

## 2023-10-09 RX ORDER — REGADENOSON 0.08 MG/ML
0.4 INJECTION, SOLUTION INTRAVENOUS ONCE
Status: COMPLETED | OUTPATIENT
Start: 2023-10-09 | End: 2023-10-09

## 2023-10-09 RX ADMIN — REGADENOSON 0.4 MG: 0.08 INJECTION, SOLUTION INTRAVENOUS at 09:10

## 2023-10-10 RX ORDER — ASPIRIN 81 MG/1
81 TABLET ORAL DAILY
Qty: 90 TABLET | Refills: 3 | Status: SHIPPED | OUTPATIENT
Start: 2023-10-10

## 2023-10-11 RX ORDER — ALBUTEROL SULFATE 90 UG/1
2 AEROSOL, METERED RESPIRATORY (INHALATION) 4 TIMES DAILY
Qty: 18 G | Refills: 3 | Status: SHIPPED | OUTPATIENT
Start: 2023-10-11

## 2023-10-11 RX ORDER — LISINOPRIL 20 MG/1
20 TABLET ORAL DAILY
Qty: 90 TABLET | Refills: 3 | Status: SHIPPED | OUTPATIENT
Start: 2023-10-11 | End: 2024-10-10

## 2023-10-11 RX ORDER — AMLODIPINE BESYLATE 5 MG/1
5 TABLET ORAL DAILY
Qty: 30 TABLET | Refills: 11 | Status: SHIPPED | OUTPATIENT
Start: 2023-10-11 | End: 2024-10-10

## 2023-10-16 ENCOUNTER — OFFICE VISIT (OUTPATIENT)
Dept: PRIMARY CARE CLINIC | Facility: CLINIC | Age: 63
End: 2023-10-16
Payer: COMMERCIAL

## 2023-10-16 VITALS
WEIGHT: 296 LBS | HEART RATE: 60 BPM | OXYGEN SATURATION: 95 % | SYSTOLIC BLOOD PRESSURE: 128 MMHG | DIASTOLIC BLOOD PRESSURE: 82 MMHG | RESPIRATION RATE: 18 BRPM | HEIGHT: 73 IN | BODY MASS INDEX: 39.23 KG/M2

## 2023-10-16 DIAGNOSIS — Z23 NEED FOR VACCINATION: ICD-10-CM

## 2023-10-16 DIAGNOSIS — I10 HYPERTENSION, UNSPECIFIED TYPE: ICD-10-CM

## 2023-10-16 DIAGNOSIS — M19.90 OSTEOARTHRITIS, UNSPECIFIED OSTEOARTHRITIS TYPE, UNSPECIFIED SITE: ICD-10-CM

## 2023-10-16 DIAGNOSIS — J44.9 CHRONIC OBSTRUCTIVE PULMONARY DISEASE, UNSPECIFIED COPD TYPE: Primary | ICD-10-CM

## 2023-10-16 PROCEDURE — 4010F PR ACE/ARB THEARPY RXD/TAKEN: ICD-10-PCS | Mod: ,,, | Performed by: FAMILY MEDICINE

## 2023-10-16 PROCEDURE — 3008F PR BODY MASS INDEX (BMI) DOCUMENTED: ICD-10-PCS | Mod: ,,, | Performed by: FAMILY MEDICINE

## 2023-10-16 PROCEDURE — 90471 IMMUNIZATION ADMIN: CPT | Mod: ,,, | Performed by: FAMILY MEDICINE

## 2023-10-16 PROCEDURE — 4010F ACE/ARB THERAPY RXD/TAKEN: CPT | Mod: ,,, | Performed by: FAMILY MEDICINE

## 2023-10-16 PROCEDURE — 90686 FLU VACCINE (QUAD) GREATER THAN OR EQUAL TO 3YO PRESERVATIVE FREE IM: ICD-10-PCS | Mod: ,,, | Performed by: FAMILY MEDICINE

## 2023-10-16 PROCEDURE — 99214 OFFICE O/P EST MOD 30 MIN: CPT | Mod: 25,,, | Performed by: FAMILY MEDICINE

## 2023-10-16 PROCEDURE — 3074F SYST BP LT 130 MM HG: CPT | Mod: ,,, | Performed by: FAMILY MEDICINE

## 2023-10-16 PROCEDURE — 3079F PR MOST RECENT DIASTOLIC BLOOD PRESSURE 80-89 MM HG: ICD-10-PCS | Mod: ,,, | Performed by: FAMILY MEDICINE

## 2023-10-16 PROCEDURE — 90686 IIV4 VACC NO PRSV 0.5 ML IM: CPT | Mod: ,,, | Performed by: FAMILY MEDICINE

## 2023-10-16 PROCEDURE — 99214 PR OFFICE/OUTPT VISIT, EST, LEVL IV, 30-39 MIN: ICD-10-PCS | Mod: 25,,, | Performed by: FAMILY MEDICINE

## 2023-10-16 PROCEDURE — 3074F PR MOST RECENT SYSTOLIC BLOOD PRESSURE < 130 MM HG: ICD-10-PCS | Mod: ,,, | Performed by: FAMILY MEDICINE

## 2023-10-16 PROCEDURE — 1159F PR MEDICATION LIST DOCUMENTED IN MEDICAL RECORD: ICD-10-PCS | Mod: ,,, | Performed by: FAMILY MEDICINE

## 2023-10-16 PROCEDURE — 3079F DIAST BP 80-89 MM HG: CPT | Mod: ,,, | Performed by: FAMILY MEDICINE

## 2023-10-16 PROCEDURE — 3008F BODY MASS INDEX DOCD: CPT | Mod: ,,, | Performed by: FAMILY MEDICINE

## 2023-10-16 PROCEDURE — 1159F MED LIST DOCD IN RCRD: CPT | Mod: ,,, | Performed by: FAMILY MEDICINE

## 2023-10-16 PROCEDURE — 90471 FLU VACCINE (QUAD) GREATER THAN OR EQUAL TO 3YO PRESERVATIVE FREE IM: ICD-10-PCS | Mod: ,,, | Performed by: FAMILY MEDICINE

## 2023-10-16 RX ORDER — FLUTICASONE PROPIONATE AND SALMETEROL 250; 50 UG/1; UG/1
1 POWDER RESPIRATORY (INHALATION) 2 TIMES DAILY
Qty: 3 EACH | Refills: 4 | Status: SHIPPED | OUTPATIENT
Start: 2023-10-16 | End: 2024-10-15

## 2023-10-16 RX ORDER — CYCLOBENZAPRINE HCL 10 MG
10 TABLET ORAL 2 TIMES DAILY PRN
COMMUNITY
Start: 2023-08-23

## 2023-10-16 RX ORDER — FLUCONAZOLE 200 MG/1
200 TABLET ORAL DAILY
Qty: 7 TABLET | Refills: 1 | Status: SHIPPED | OUTPATIENT
Start: 2023-10-16 | End: 2023-10-26

## 2023-10-16 NOTE — PROGRESS NOTES
Subjective:      Patient ID: Camilo Olivera is a 62 y.o. male.    Chief Complaint: Follow-up and Results    Camilo Olivera a 62 y.o. male presents for follow up on all regular problems which are reviewed and discussed.     Problem List Items Addressed This Visit          Pulmonary    COPD (chronic obstructive pulmonary disease) - Primary       Cardiac/Vascular    HTN (hypertension)       ID    Need for vaccination    Relevant Orders    Influenza - Quadrivalent *Preferred* (6 months+) (PF) (Completed)       Orthopedic    DJD (degenerative joint disease)       Past Medical History:  History reviewed. No pertinent past medical history.  Past Surgical History:   Procedure Laterality Date    HERNIA REPAIR  1990's    Umbilical     Review of patient's allergies indicates:  No Known Allergies  Current Outpatient Medications on File Prior to Visit   Medication Sig Dispense Refill    albuterol (PROVENTIL/VENTOLIN HFA) 90 mcg/actuation inhaler Inhale 2 puffs into the lungs 4 (four) times daily. Rescue 18 g 3    allopurinoL (ZYLOPRIM) 300 MG tablet Take 1 tablet (300 mg total) by mouth once daily. 90 tablet 3    amLODIPine (NORVASC) 5 MG tablet Take 1 tablet (5 mg total) by mouth once daily. 30 tablet 11    amlodipine-benazepril 5-20 mg (LOTREL) 5-20 mg per capsule Take 1 capsule by mouth 2 (two) times a day. 180 capsule 3    aspirin (ECOTRIN) 81 MG EC tablet Take 1 tablet (81 mg total) by mouth once daily. 90 tablet 3    beta-carotene,A,-vits C,E/mins (OCUVITE ORAL) Take by mouth.      bisoprolol-hydrochlorothiazide 5-6.25 mg (ZIAC) 5-6.25 mg Tab Take 1 tablet by mouth once daily. 90 tablet 3    cetirizine (ZYRTEC) 10 MG tablet Take 10 mg by mouth once daily.      cyclobenzaprine (FLEXERIL) 10 MG tablet Take 10 mg by mouth 2 (two) times daily as needed.      folic acid/multivit-min/lutein (CENTRUM SILVER ORAL) Take 1 tablet by mouth Daily.      lisinopriL (PRINIVIL,ZESTRIL) 20 MG tablet Take 1 tablet (20 mg total) by  "mouth once daily. 90 tablet 3    meloxicam (MOBIC) 7.5 MG tablet Take 1 tablet (7.5 mg total) by mouth once daily. 90 tablet 3    vitamin D (VITAMIN D3) 1000 units Tab Take 1,000 Units by mouth once daily.       No current facility-administered medications on file prior to visit.     Social History     Socioeconomic History    Marital status:    Tobacco Use    Smoking status: Former     Current packs/day: 0.00     Average packs/day: 1 pack/day for 42.7 years (42.7 ttl pk-yrs)     Types: Cigarettes, Vaping with nicotine     Start date: 1980     Quit date: 9/10/2022     Years since quittin.0    Smokeless tobacco: Never    Tobacco comments:     I  like  smoking   Substance and Sexual Activity    Alcohol use: Yes     Alcohol/week: 12.0 standard drinks of alcohol     Types: 12 Drinks containing 0.5 oz of alcohol per week    Drug use: Never    Sexual activity: Yes     Partners: Female     Birth control/protection: None     Family History   Problem Relation Age of Onset    Hypertension Mother     Cancer Maternal Grandmother         Breast    Arthritis Maternal Aunt         Hands    COPD Paternal Uncle     Heart disease Brother         Heart valve    Vision loss Paternal Aunt         Glocoma       Review of Systems   Constitutional: Negative.    HENT:  Negative for congestion, ear pain, nosebleeds and trouble swallowing.    Eyes:  Negative for pain and itching.   Respiratory:  Negative for chest tightness.    Cardiovascular:  Negative for chest pain.   Gastrointestinal:  Negative for abdominal distention.   Endocrine: Negative for cold intolerance and heat intolerance.   Genitourinary:  Negative for difficulty urinating.   Musculoskeletal:  Negative for arthralgias.   Neurological:  Negative for dizziness.       Objective:     /82 (BP Location: Right arm, Patient Position: Sitting, BP Method: Large (Manual))   Pulse 60   Resp 18   Ht 6' 1" (1.854 m)   Wt 134.3 kg (296 lb)   SpO2 95%   BMI 39.05 " kg/m²     Physical Exam  Constitutional:       Appearance: Normal appearance. He is obese.   HENT:      Head: Normocephalic and atraumatic.      Right Ear: External ear normal.      Left Ear: External ear normal.      Nose: Nose normal.      Mouth/Throat:      Mouth: Mucous membranes are moist.      Pharynx: Oropharynx is clear.   Eyes:      Pupils: Pupils are equal, round, and reactive to light.   Cardiovascular:      Rate and Rhythm: Normal rate and regular rhythm.      Heart sounds: Normal heart sounds. No murmur heard.     No gallop.   Pulmonary:      Effort: Pulmonary effort is normal. No respiratory distress.      Breath sounds: Normal breath sounds. No wheezing.   Abdominal:      Palpations: Abdomen is soft.   Musculoskeletal:         General: Normal range of motion.      Cervical back: Normal range of motion and neck supple.   Skin:     General: Skin is warm and dry.   Neurological:      General: No focal deficit present.      Mental Status: He is alert.   Psychiatric:         Mood and Affect: Mood normal.         Behavior: Behavior normal.         Thought Content: Thought content normal.         Judgment: Judgment normal.         1. Chronic obstructive pulmonary disease, unspecified COPD type    2. Need for vaccination    3. Hypertension, unspecified type    4. Osteoarthritis, unspecified osteoarthritis type, unspecified site        Plan:     Problem List Items Addressed This Visit          Pulmonary    COPD (chronic obstructive pulmonary disease) - Primary       Cardiac/Vascular    HTN (hypertension)       ID    Need for vaccination    Relevant Orders    Influenza - Quadrivalent *Preferred* (6 months+) (PF) (Completed)       Orthopedic    DJD (degenerative joint disease)     No follow-ups on file.  Trial advair  Has cardio apt. Lexiscan pending  I am having Camilo FOYJuan Luis HuttonJoselin start on fluticasone-salmeterol 250-50 mcg/dose and fluconazole. I am also having him maintain his vitamin D, folic  acid/multivit-min/lutein (CENTRUM SILVER ORAL), (beta-carotene,A,-vits C,E/mins (OCUVITE ORAL)), cetirizine, meloxicam, allopurinoL, amlodipine-benazepril 5-20 mg, bisoprolol-hydrochlorothiazide 5-6.25 mg, aspirin, albuterol, lisinopriL, amLODIPine, and cyclobenzaprine.    Camilo was seen today for follow-up and results.    Diagnoses and all orders for this visit:    Chronic obstructive pulmonary disease, unspecified COPD type    Need for vaccination  -     Influenza - Quadrivalent *Preferred* (6 months+) (PF)    Hypertension, unspecified type    Osteoarthritis, unspecified osteoarthritis type, unspecified site    Other orders  -     fluticasone-salmeterol diskus inhaler 250-50 mcg; Inhale 1 puff into the lungs 2 (two) times daily. Controller  -     fluconazole (DIFLUCAN) 200 MG Tab; Take 1 tablet (200 mg total) by mouth once daily.      Medications Ordered This Encounter   Medications    fluconazole (DIFLUCAN) 200 MG Tab     Sig: Take 1 tablet (200 mg total) by mouth once daily.     Dispense:  7 tablet     Refill:  1    fluticasone-salmeterol diskus inhaler 250-50 mcg     Sig: Inhale 1 puff into the lungs 2 (two) times daily. Controller     Dispense:  3 each     Refill:  4     [unfilled]  Orders Placed This Encounter   Procedures    Influenza - Quadrivalent *Preferred* (6 months+) (PF)

## 2023-10-17 ENCOUNTER — TELEPHONE (OUTPATIENT)
Dept: PRIMARY CARE CLINIC | Facility: CLINIC | Age: 63
End: 2023-10-17
Payer: COMMERCIAL

## 2023-10-17 DIAGNOSIS — R94.31 ABNORMAL EKG: Primary | ICD-10-CM

## 2023-10-17 LAB
CV STRESS BASE HR: 61 BPM
DIASTOLIC BLOOD PRESSURE: 77 MMHG
OHS CV CPX 85 PERCENT MAX PREDICTED HEART RATE MALE: 135
OHS CV CPX MAX PREDICTED HEART RATE: 159
OHS CV CPX PATIENT IS FEMALE: 0
OHS CV CPX PATIENT IS MALE: 1
OHS CV CPX PEAK DIASTOLIC BLOOD PRESSURE: 77 MMHG
OHS CV CPX PEAK HEAR RATE: 76 BPM
OHS CV CPX PEAK RATE PRESSURE PRODUCT: NORMAL
OHS CV CPX PEAK SYSTOLIC BLOOD PRESSURE: 140 MMHG
OHS CV CPX PERCENT MAX PREDICTED HEART RATE ACHIEVED: 48
OHS CV CPX RATE PRESSURE PRODUCT PRESENTING: 8540
SYSTOLIC BLOOD PRESSURE: 140 MMHG

## 2023-10-17 NOTE — TELEPHONE ENCOUNTER
----- Message from Alex Melendez III, DO sent at 10/17/2023  1:08 PM CDT -----  No sign of active blockage.  Does show scar or prior mi. If hasnt heard from cardio for appt. Please help facilitate

## 2023-10-26 ENCOUNTER — OFFICE VISIT (OUTPATIENT)
Dept: CARDIOLOGY | Facility: CLINIC | Age: 63
End: 2023-10-26
Payer: COMMERCIAL

## 2023-10-26 VITALS
WEIGHT: 300.81 LBS | HEIGHT: 73 IN | OXYGEN SATURATION: 94 % | BODY MASS INDEX: 39.87 KG/M2 | HEART RATE: 61 BPM | SYSTOLIC BLOOD PRESSURE: 142 MMHG | DIASTOLIC BLOOD PRESSURE: 84 MMHG

## 2023-10-26 DIAGNOSIS — Z01.818 OTHER SPECIFIED PRE-OPERATIVE EXAMINATION: ICD-10-CM

## 2023-10-26 DIAGNOSIS — R94.31 NONSPECIFIC ABNORMAL ELECTROCARDIOGRAM (ECG) (EKG): ICD-10-CM

## 2023-10-26 DIAGNOSIS — I10 HYPERTENSION, UNSPECIFIED TYPE: Chronic | ICD-10-CM

## 2023-10-26 DIAGNOSIS — J44.9 CHRONIC OBSTRUCTIVE PULMONARY DISEASE, UNSPECIFIED COPD TYPE: Chronic | ICD-10-CM

## 2023-10-26 DIAGNOSIS — R06.09 DOE (DYSPNEA ON EXERTION): ICD-10-CM

## 2023-10-26 DIAGNOSIS — Z01.812 PRE-OPERATIVE LABORATORY EXAMINATION: ICD-10-CM

## 2023-10-26 DIAGNOSIS — R07.9 CHEST PAIN, UNSPECIFIED TYPE: Primary | ICD-10-CM

## 2023-10-26 PROCEDURE — 99204 OFFICE O/P NEW MOD 45 MIN: CPT | Mod: S$PBB,,, | Performed by: INTERNAL MEDICINE

## 2023-10-26 PROCEDURE — 99214 OFFICE O/P EST MOD 30 MIN: CPT | Mod: PBBFAC | Performed by: INTERNAL MEDICINE

## 2023-10-26 PROCEDURE — 3008F BODY MASS INDEX DOCD: CPT | Mod: ,,, | Performed by: INTERNAL MEDICINE

## 2023-10-26 PROCEDURE — 99204 PR OFFICE/OUTPT VISIT, NEW, LEVL IV, 45-59 MIN: ICD-10-PCS | Mod: S$PBB,,, | Performed by: INTERNAL MEDICINE

## 2023-10-26 PROCEDURE — 1160F PR REVIEW ALL MEDS BY PRESCRIBER/CLIN PHARMACIST DOCUMENTED: ICD-10-PCS | Mod: ,,, | Performed by: INTERNAL MEDICINE

## 2023-10-26 PROCEDURE — 4010F ACE/ARB THERAPY RXD/TAKEN: CPT | Mod: ,,, | Performed by: INTERNAL MEDICINE

## 2023-10-26 PROCEDURE — 3079F PR MOST RECENT DIASTOLIC BLOOD PRESSURE 80-89 MM HG: ICD-10-PCS | Mod: ,,, | Performed by: INTERNAL MEDICINE

## 2023-10-26 PROCEDURE — 3077F PR MOST RECENT SYSTOLIC BLOOD PRESSURE >= 140 MM HG: ICD-10-PCS | Mod: ,,, | Performed by: INTERNAL MEDICINE

## 2023-10-26 PROCEDURE — 1160F RVW MEDS BY RX/DR IN RCRD: CPT | Mod: ,,, | Performed by: INTERNAL MEDICINE

## 2023-10-26 PROCEDURE — 3008F PR BODY MASS INDEX (BMI) DOCUMENTED: ICD-10-PCS | Mod: ,,, | Performed by: INTERNAL MEDICINE

## 2023-10-26 PROCEDURE — 3077F SYST BP >= 140 MM HG: CPT | Mod: ,,, | Performed by: INTERNAL MEDICINE

## 2023-10-26 PROCEDURE — 3079F DIAST BP 80-89 MM HG: CPT | Mod: ,,, | Performed by: INTERNAL MEDICINE

## 2023-10-26 PROCEDURE — 4010F PR ACE/ARB THEARPY RXD/TAKEN: ICD-10-PCS | Mod: ,,, | Performed by: INTERNAL MEDICINE

## 2023-10-26 PROCEDURE — 1159F PR MEDICATION LIST DOCUMENTED IN MEDICAL RECORD: ICD-10-PCS | Mod: ,,, | Performed by: INTERNAL MEDICINE

## 2023-10-26 PROCEDURE — 1159F MED LIST DOCD IN RCRD: CPT | Mod: ,,, | Performed by: INTERNAL MEDICINE

## 2023-10-26 RX ORDER — DIPHENHYDRAMINE HCL 25 MG
50 CAPSULE ORAL
Status: CANCELLED | OUTPATIENT
Start: 2023-11-16

## 2023-10-26 RX ORDER — DIAZEPAM 2 MG/1
5 TABLET ORAL
Status: CANCELLED | OUTPATIENT
Start: 2023-11-16

## 2023-10-26 RX ORDER — SODIUM CHLORIDE 450 MG/100ML
INJECTION, SOLUTION INTRAVENOUS CONTINUOUS
Status: CANCELLED | OUTPATIENT
Start: 2023-11-16

## 2023-10-26 NOTE — H&P (VIEW-ONLY)
PCP: Alex Melendez III, DO    Referring Provider:     Subjective:   Camilo Olivera is a 62 y.o. male with hx of HTN and COPD who presents for cardiac evaluation of abnormal ekg.  Referred by Dr. Al SMITH.  Patient states that 7 years ago a heart doctor told him that he had had a previous MI.     Fhx:  Family History   Problem Relation Age of Onset    Hypertension Mother     Cancer Maternal Grandmother         Breast    Arthritis Maternal Aunt         Hands    COPD Paternal Uncle     Heart disease Brother         Heart valve    Vision loss Paternal Aunt         Glocoma     Shx:   Social History     Socioeconomic History    Marital status:    Tobacco Use    Smoking status: Former     Current packs/day: 0.00     Average packs/day: 1 pack/day for 42.7 years (42.7 ttl pk-yrs)     Types: Cigarettes, Vaping with nicotine     Start date: 1980     Quit date: 9/10/2022     Years since quittin.1    Smokeless tobacco: Never    Tobacco comments:     I  like  smoking   Substance and Sexual Activity    Alcohol use: Yes     Alcohol/week: 12.0 standard drinks of alcohol     Types: 12 Drinks containing 0.5 oz of alcohol per week    Drug use: Never    Sexual activity: Yes     Partners: Female     Birth control/protection: None       EKG   10/25/23--sinus bradycardia, nonspecific St abn, 55 bpm    ECHO Results for orders placed during the hospital encounter of 10/09/23    Echo    Interpretation Summary    Left Ventricle: The left ventricle is mildly dilated. Normal wall thickness. Normal wall motion. There is normal systolic function. Ejection fraction by visual approximation is 60%. Global longitudinal strain is reduced. There is normal diastolic function.    Right Ventricle: Normal right ventricular cavity size. Systolic function is normal.    Aortic Valve: The aortic valve is a trileaflet valve.    Pulmonary Artery: The estimated pulmonary artery systolic pressure is 14 mmHg.    IVC/SVC: Normal venous  pressure at 3 mmHg.    LEXISCAN:  10/17/23--  1.  Scintigraphically negative for ischemia.  2. Large size, severe intensity fixed defect in the [id number] inferior wall with decreased wall motion and thickening, suggestive of infarct.  3. the global left ventricular systolic function is normal with an LV ejection fraction of 66 % and no evidence of LV dilatation. Wall motion is impaired.         Lab Results   Component Value Date     09/27/2023    K 3.7 09/27/2023     09/27/2023    CO2 33 (H) 09/27/2023    BUN 20 (H) 09/27/2023    CREATININE 0.91 09/27/2023    CALCIUM 8.9 09/27/2023    ANIONGAP 6 (L) 09/27/2023       Lab Results   Component Value Date    CHOL 183 09/27/2023    CHOL 153 11/09/2022     Lab Results   Component Value Date    HDL 34 (L) 09/27/2023    HDL 29 (L) 11/09/2022     Lab Results   Component Value Date    LDLCALC 114 09/27/2023    LDLCALC 105 11/09/2022     Lab Results   Component Value Date    TRIG 177 (H) 09/27/2023    TRIG 95 11/09/2022     Lab Results   Component Value Date    CHOLHDL 5.4 09/27/2023    CHOLHDL 5.3 11/09/2022       Lab Results   Component Value Date    WBC 5.79 09/27/2023    HGB 16.3 09/27/2023    HCT 46.9 09/27/2023    MCV 95.3 09/27/2023     09/27/2023           Current Outpatient Medications:     albuterol (PROVENTIL/VENTOLIN HFA) 90 mcg/actuation inhaler, Inhale 2 puffs into the lungs 4 (four) times daily. Rescue, Disp: 18 g, Rfl: 3    allopurinoL (ZYLOPRIM) 300 MG tablet, Take 1 tablet (300 mg total) by mouth once daily., Disp: 90 tablet, Rfl: 3    amLODIPine (NORVASC) 5 MG tablet, Take 1 tablet (5 mg total) by mouth once daily., Disp: 30 tablet, Rfl: 11    aspirin (ECOTRIN) 81 MG EC tablet, Take 1 tablet (81 mg total) by mouth once daily., Disp: 90 tablet, Rfl: 3    beta-carotene,A,-vits C,E/mins (OCUVITE ORAL), Take by mouth., Disp: , Rfl:     bisoprolol-hydrochlorothiazide 5-6.25 mg (ZIAC) 5-6.25 mg Tab, Take 1 tablet by mouth once daily., Disp:  "90 tablet, Rfl: 3    cetirizine (ZYRTEC) 10 MG tablet, Take 10 mg by mouth once daily., Disp: , Rfl:     fluticasone-salmeterol diskus inhaler 250-50 mcg, Inhale 1 puff into the lungs 2 (two) times daily. Controller, Disp: 3 each, Rfl: 4    folic acid/multivit-min/lutein (CENTRUM SILVER ORAL), Take 1 tablet by mouth Daily., Disp: , Rfl:     lisinopriL (PRINIVIL,ZESTRIL) 20 MG tablet, Take 1 tablet (20 mg total) by mouth once daily., Disp: 90 tablet, Rfl: 3    meloxicam (MOBIC) 7.5 MG tablet, Take 1 tablet (7.5 mg total) by mouth once daily., Disp: 90 tablet, Rfl: 3    vitamin D (VITAMIN D3) 1000 units Tab, Take 1,000 Units by mouth once daily., Disp: , Rfl:     cyclobenzaprine (FLEXERIL) 10 MG tablet, Take 10 mg by mouth 2 (two) times daily as needed., Disp: , Rfl:   Medications reviewed and reconciled.    Review of Systems   Respiratory:  Positive for shortness of breath.    Cardiovascular:  Positive for chest pain. Negative for palpitations and leg swelling.   Neurological:  Positive for dizziness. Negative for loss of consciousness.           Objective:   BP (!) 142/84 (BP Location: Left arm, Patient Position: Sitting)   Pulse 61   Ht 6' 1" (1.854 m)   Wt (!) 136.4 kg (300 lb 12.8 oz)   SpO2 (!) 94%   BMI 39.69 kg/m²     Physical Exam  Vitals reviewed.   Constitutional:       Appearance: Normal appearance.   HENT:      Head: Normocephalic and atraumatic.   Neck:      Vascular: No carotid bruit or JVD.   Cardiovascular:      Rate and Rhythm: Normal rate and regular rhythm.      Pulses: Normal pulses.           Radial pulses are 2+ on the right side and 2+ on the left side.        Dorsalis pedis pulses are 2+ on the right side and 2+ on the left side.      Heart sounds: Normal heart sounds. No murmur heard.  Pulmonary:      Effort: Pulmonary effort is normal.      Breath sounds: Wheezing present.      Comments: Few expiratory wheezes  Musculoskeletal:      Right lower leg: No edema.      Left lower leg: No " edema.   Skin:     General: Skin is warm and dry.   Neurological:      Mental Status: He is alert and oriented to person, place, and time.           Assessment:     1. Chest pain, unspecified type  CBC Auto Differential    Basic Metabolic Panel    X-Ray Chest PA And Lateral    Case Request-Cath Lab: Left heart cath    Height and weight    Clip and Prep Right Radial, Left Femoral, Right Femoral    Verify informed consent    Vital signs    Cardiac Monitoring - Adult    Pulse Oximetry Q4H    Diet NPO    Full code    diphenhydrAMINE capsule 50 mg    diazePAM tablet 6 mg    Insert peripheral IV    0.45% NaCl infusion    Place in Outpatient    Case Request-Cath Lab: Left heart cath    CANCELED: X-Ray Chest PA And Lateral    CANCELED: Echo    CANCELED: Cardiac Monitor - 3-15 Day Adult (Cupid Only)    CANCELED: Exercise Stress - EKG      2. Hypertension, unspecified type  CBC Auto Differential    Basic Metabolic Panel    X-Ray Chest PA And Lateral    CANCELED: X-Ray Chest PA And Lateral    CANCELED: Echo    CANCELED: Cardiac Monitor - 3-15 Day Adult (Cupid Only)    CANCELED: Exercise Stress - EKG      3. Pre-operative laboratory examination  CBC Auto Differential    Basic Metabolic Panel      4. Other specified pre-operative examination  X-Ray Chest PA And Lateral      5. CHADWICK (dyspnea on exertion)  Case Request-Cath Lab: Left heart cath    Height and weight    Clip and Prep Right Radial, Left Femoral, Right Femoral    Verify informed consent    Vital signs    Cardiac Monitoring - Adult    Pulse Oximetry Q4H    Diet NPO    Full code    diphenhydrAMINE capsule 50 mg    diazePAM tablet 6 mg    Insert peripheral IV    0.45% NaCl infusion    Place in Outpatient    Case Request-Cath Lab: Left heart cath      6. Chronic obstructive pulmonary disease, unspecified COPD type        7. Nonspecific abnormal electrocardiogram (ECG) (EKG)  Case Request-Cath Lab: Left heart cath    Height and weight    Clip and Prep Right Radial, Left  Femoral, Right Femoral    Verify informed consent    Vital signs    Cardiac Monitoring - Adult    Pulse Oximetry Q4H    Diet NPO    Full code    diphenhydrAMINE capsule 50 mg    diazePAM tablet 6 mg    Insert peripheral IV    0.45% NaCl infusion    Place in Outpatient    Case Request-Cath Lab: Left heart cath            Plan:   LHC with poss pci.  R/B/A were explained  Continue current medications.  F/u after LHC.

## 2023-10-26 NOTE — PROGRESS NOTES
PCP: Alex Melendez III, DO    Referring Provider:     Subjective:   Camilo Olivera is a 62 y.o. male with hx of HTN and COPD who presents for cardiac evaluation of abnormal ekg.  Referred by Dr. Al SMITH.  Patient states that 7 years ago a heart doctor told him that he had had a previous MI.     Fhx:  Family History   Problem Relation Age of Onset    Hypertension Mother     Cancer Maternal Grandmother         Breast    Arthritis Maternal Aunt         Hands    COPD Paternal Uncle     Heart disease Brother         Heart valve    Vision loss Paternal Aunt         Glocoma     Shx:   Social History     Socioeconomic History    Marital status:    Tobacco Use    Smoking status: Former     Current packs/day: 0.00     Average packs/day: 1 pack/day for 42.7 years (42.7 ttl pk-yrs)     Types: Cigarettes, Vaping with nicotine     Start date: 1980     Quit date: 9/10/2022     Years since quittin.1    Smokeless tobacco: Never    Tobacco comments:     I  like  smoking   Substance and Sexual Activity    Alcohol use: Yes     Alcohol/week: 12.0 standard drinks of alcohol     Types: 12 Drinks containing 0.5 oz of alcohol per week    Drug use: Never    Sexual activity: Yes     Partners: Female     Birth control/protection: None       EKG   10/25/23--sinus bradycardia, nonspecific St abn, 55 bpm    ECHO Results for orders placed during the hospital encounter of 10/09/23    Echo    Interpretation Summary    Left Ventricle: The left ventricle is mildly dilated. Normal wall thickness. Normal wall motion. There is normal systolic function. Ejection fraction by visual approximation is 60%. Global longitudinal strain is reduced. There is normal diastolic function.    Right Ventricle: Normal right ventricular cavity size. Systolic function is normal.    Aortic Valve: The aortic valve is a trileaflet valve.    Pulmonary Artery: The estimated pulmonary artery systolic pressure is 14 mmHg.    IVC/SVC: Normal venous  pressure at 3 mmHg.    LEXISCAN:  10/17/23--  1.  Scintigraphically negative for ischemia.  2. Large size, severe intensity fixed defect in the [id number] inferior wall with decreased wall motion and thickening, suggestive of infarct.  3. the global left ventricular systolic function is normal with an LV ejection fraction of 66 % and no evidence of LV dilatation. Wall motion is impaired.         Lab Results   Component Value Date     09/27/2023    K 3.7 09/27/2023     09/27/2023    CO2 33 (H) 09/27/2023    BUN 20 (H) 09/27/2023    CREATININE 0.91 09/27/2023    CALCIUM 8.9 09/27/2023    ANIONGAP 6 (L) 09/27/2023       Lab Results   Component Value Date    CHOL 183 09/27/2023    CHOL 153 11/09/2022     Lab Results   Component Value Date    HDL 34 (L) 09/27/2023    HDL 29 (L) 11/09/2022     Lab Results   Component Value Date    LDLCALC 114 09/27/2023    LDLCALC 105 11/09/2022     Lab Results   Component Value Date    TRIG 177 (H) 09/27/2023    TRIG 95 11/09/2022     Lab Results   Component Value Date    CHOLHDL 5.4 09/27/2023    CHOLHDL 5.3 11/09/2022       Lab Results   Component Value Date    WBC 5.79 09/27/2023    HGB 16.3 09/27/2023    HCT 46.9 09/27/2023    MCV 95.3 09/27/2023     09/27/2023           Current Outpatient Medications:     albuterol (PROVENTIL/VENTOLIN HFA) 90 mcg/actuation inhaler, Inhale 2 puffs into the lungs 4 (four) times daily. Rescue, Disp: 18 g, Rfl: 3    allopurinoL (ZYLOPRIM) 300 MG tablet, Take 1 tablet (300 mg total) by mouth once daily., Disp: 90 tablet, Rfl: 3    amLODIPine (NORVASC) 5 MG tablet, Take 1 tablet (5 mg total) by mouth once daily., Disp: 30 tablet, Rfl: 11    aspirin (ECOTRIN) 81 MG EC tablet, Take 1 tablet (81 mg total) by mouth once daily., Disp: 90 tablet, Rfl: 3    beta-carotene,A,-vits C,E/mins (OCUVITE ORAL), Take by mouth., Disp: , Rfl:     bisoprolol-hydrochlorothiazide 5-6.25 mg (ZIAC) 5-6.25 mg Tab, Take 1 tablet by mouth once daily., Disp:  "90 tablet, Rfl: 3    cetirizine (ZYRTEC) 10 MG tablet, Take 10 mg by mouth once daily., Disp: , Rfl:     fluticasone-salmeterol diskus inhaler 250-50 mcg, Inhale 1 puff into the lungs 2 (two) times daily. Controller, Disp: 3 each, Rfl: 4    folic acid/multivit-min/lutein (CENTRUM SILVER ORAL), Take 1 tablet by mouth Daily., Disp: , Rfl:     lisinopriL (PRINIVIL,ZESTRIL) 20 MG tablet, Take 1 tablet (20 mg total) by mouth once daily., Disp: 90 tablet, Rfl: 3    meloxicam (MOBIC) 7.5 MG tablet, Take 1 tablet (7.5 mg total) by mouth once daily., Disp: 90 tablet, Rfl: 3    vitamin D (VITAMIN D3) 1000 units Tab, Take 1,000 Units by mouth once daily., Disp: , Rfl:     cyclobenzaprine (FLEXERIL) 10 MG tablet, Take 10 mg by mouth 2 (two) times daily as needed., Disp: , Rfl:   Medications reviewed and reconciled.    Review of Systems   Respiratory:  Positive for shortness of breath.    Cardiovascular:  Positive for chest pain. Negative for palpitations and leg swelling.   Neurological:  Positive for dizziness. Negative for loss of consciousness.           Objective:   BP (!) 142/84 (BP Location: Left arm, Patient Position: Sitting)   Pulse 61   Ht 6' 1" (1.854 m)   Wt (!) 136.4 kg (300 lb 12.8 oz)   SpO2 (!) 94%   BMI 39.69 kg/m²     Physical Exam  Vitals reviewed.   Constitutional:       Appearance: Normal appearance.   HENT:      Head: Normocephalic and atraumatic.   Neck:      Vascular: No carotid bruit or JVD.   Cardiovascular:      Rate and Rhythm: Normal rate and regular rhythm.      Pulses: Normal pulses.           Radial pulses are 2+ on the right side and 2+ on the left side.        Dorsalis pedis pulses are 2+ on the right side and 2+ on the left side.      Heart sounds: Normal heart sounds. No murmur heard.  Pulmonary:      Effort: Pulmonary effort is normal.      Breath sounds: Wheezing present.      Comments: Few expiratory wheezes  Musculoskeletal:      Right lower leg: No edema.      Left lower leg: No " edema.   Skin:     General: Skin is warm and dry.   Neurological:      Mental Status: He is alert and oriented to person, place, and time.           Assessment:     1. Chest pain, unspecified type  CBC Auto Differential    Basic Metabolic Panel    X-Ray Chest PA And Lateral    Case Request-Cath Lab: Left heart cath    Height and weight    Clip and Prep Right Radial, Left Femoral, Right Femoral    Verify informed consent    Vital signs    Cardiac Monitoring - Adult    Pulse Oximetry Q4H    Diet NPO    Full code    diphenhydrAMINE capsule 50 mg    diazePAM tablet 6 mg    Insert peripheral IV    0.45% NaCl infusion    Place in Outpatient    Case Request-Cath Lab: Left heart cath    CANCELED: X-Ray Chest PA And Lateral    CANCELED: Echo    CANCELED: Cardiac Monitor - 3-15 Day Adult (Cupid Only)    CANCELED: Exercise Stress - EKG      2. Hypertension, unspecified type  CBC Auto Differential    Basic Metabolic Panel    X-Ray Chest PA And Lateral    CANCELED: X-Ray Chest PA And Lateral    CANCELED: Echo    CANCELED: Cardiac Monitor - 3-15 Day Adult (Cupid Only)    CANCELED: Exercise Stress - EKG      3. Pre-operative laboratory examination  CBC Auto Differential    Basic Metabolic Panel      4. Other specified pre-operative examination  X-Ray Chest PA And Lateral      5. CHADWICK (dyspnea on exertion)  Case Request-Cath Lab: Left heart cath    Height and weight    Clip and Prep Right Radial, Left Femoral, Right Femoral    Verify informed consent    Vital signs    Cardiac Monitoring - Adult    Pulse Oximetry Q4H    Diet NPO    Full code    diphenhydrAMINE capsule 50 mg    diazePAM tablet 6 mg    Insert peripheral IV    0.45% NaCl infusion    Place in Outpatient    Case Request-Cath Lab: Left heart cath      6. Chronic obstructive pulmonary disease, unspecified COPD type        7. Nonspecific abnormal electrocardiogram (ECG) (EKG)  Case Request-Cath Lab: Left heart cath    Height and weight    Clip and Prep Right Radial, Left  Femoral, Right Femoral    Verify informed consent    Vital signs    Cardiac Monitoring - Adult    Pulse Oximetry Q4H    Diet NPO    Full code    diphenhydrAMINE capsule 50 mg    diazePAM tablet 6 mg    Insert peripheral IV    0.45% NaCl infusion    Place in Outpatient    Case Request-Cath Lab: Left heart cath            Plan:   LHC with poss pci.  R/B/A were explained  Continue current medications.  F/u after LHC.

## 2023-11-01 ENCOUNTER — HOSPITAL ENCOUNTER (OUTPATIENT)
Dept: RADIOLOGY | Facility: HOSPITAL | Age: 63
Discharge: HOME | End: 2023-11-01
Attending: INTERNAL MEDICINE
Payer: COMMERCIAL

## 2023-11-01 DIAGNOSIS — R07.9 CHEST PAIN, UNSPECIFIED TYPE: ICD-10-CM

## 2023-11-01 DIAGNOSIS — Z01.818 OTHER SPECIFIED PRE-OPERATIVE EXAMINATION: ICD-10-CM

## 2023-11-01 DIAGNOSIS — I10 HYPERTENSION, UNSPECIFIED TYPE: ICD-10-CM

## 2023-11-01 PROCEDURE — 71046 X-RAY EXAM CHEST 2 VIEWS: CPT | Mod: TC

## 2023-11-01 PROCEDURE — 71046 X-RAY EXAM CHEST 2 VIEWS: CPT | Mod: 26,,, | Performed by: RADIOLOGY

## 2023-11-01 PROCEDURE — 71046 XR CHEST PA AND LATERAL: ICD-10-PCS | Mod: 26,,, | Performed by: RADIOLOGY

## 2023-11-16 ENCOUNTER — HOSPITAL ENCOUNTER (OUTPATIENT)
Facility: HOSPITAL | Age: 63
Discharge: HOME | End: 2023-11-16
Attending: INTERNAL MEDICINE | Admitting: INTERNAL MEDICINE
Payer: COMMERCIAL

## 2023-11-16 VITALS
OXYGEN SATURATION: 96 % | HEART RATE: 45 BPM | WEIGHT: 285 LBS | HEIGHT: 73 IN | SYSTOLIC BLOOD PRESSURE: 147 MMHG | TEMPERATURE: 99 F | RESPIRATION RATE: 18 BRPM | BODY MASS INDEX: 37.77 KG/M2 | DIASTOLIC BLOOD PRESSURE: 79 MMHG

## 2023-11-16 DIAGNOSIS — R07.9 CHEST PAIN, UNSPECIFIED TYPE: Primary | ICD-10-CM

## 2023-11-16 DIAGNOSIS — R94.31 NONSPECIFIC ABNORMAL ELECTROCARDIOGRAM (ECG) (EKG): ICD-10-CM

## 2023-11-16 DIAGNOSIS — R06.09 DOE (DYSPNEA ON EXERTION): ICD-10-CM

## 2023-11-16 PROBLEM — R07.89 OTHER CHEST PAIN: Status: RESOLVED | Noted: 2023-10-26 | Resolved: 2023-11-16

## 2023-11-16 PROBLEM — R07.89 OTHER CHEST PAIN: Status: ACTIVE | Noted: 2023-10-26

## 2023-11-16 PROCEDURE — 99152 MOD SED SAME PHYS/QHP 5/>YRS: CPT | Performed by: INTERNAL MEDICINE

## 2023-11-16 PROCEDURE — C1760 CLOSURE DEV, VASC: HCPCS | Performed by: INTERNAL MEDICINE

## 2023-11-16 PROCEDURE — 99152 PR MOD CONSCIOUS SEDATION, SAME PHYS, 5+ YRS, FIRST 15 MIN: ICD-10-PCS | Mod: ,,, | Performed by: INTERNAL MEDICINE

## 2023-11-16 PROCEDURE — 93458 PR CATH PLACE/CORON ANGIO, IMG SUPER/INTERP,W LEFT HEART VENTRICULOGRAPHY: ICD-10-PCS | Mod: 26,,, | Performed by: INTERNAL MEDICINE

## 2023-11-16 PROCEDURE — 25000003 PHARM REV CODE 250: Performed by: INTERNAL MEDICINE

## 2023-11-16 PROCEDURE — C1894 INTRO/SHEATH, NON-LASER: HCPCS | Performed by: INTERNAL MEDICINE

## 2023-11-16 PROCEDURE — 93458 L HRT ARTERY/VENTRICLE ANGIO: CPT | Mod: 26,,, | Performed by: INTERNAL MEDICINE

## 2023-11-16 PROCEDURE — 99152 MOD SED SAME PHYS/QHP 5/>YRS: CPT | Mod: ,,, | Performed by: INTERNAL MEDICINE

## 2023-11-16 PROCEDURE — 93458 L HRT ARTERY/VENTRICLE ANGIO: CPT | Performed by: INTERNAL MEDICINE

## 2023-11-16 PROCEDURE — 27201423 OPTIME MED/SURG SUP & DEVICES STERILE SUPPLY: Performed by: INTERNAL MEDICINE

## 2023-11-16 PROCEDURE — 25500020 PHARM REV CODE 255: Performed by: INTERNAL MEDICINE

## 2023-11-16 PROCEDURE — 63600175 PHARM REV CODE 636 W HCPCS: Performed by: INTERNAL MEDICINE

## 2023-11-16 PROCEDURE — G0378 HOSPITAL OBSERVATION PER HR: HCPCS

## 2023-11-16 DEVICE — ANGIO-SEAL VIP VASCULAR CLOSURE DEVICE
Type: IMPLANTABLE DEVICE | Site: GROIN | Status: FUNCTIONAL
Brand: ANGIO-SEAL

## 2023-11-16 RX ORDER — LIDOCAINE HYDROCHLORIDE 10 MG/ML
INJECTION INFILTRATION; PERINEURAL
Status: DISCONTINUED | OUTPATIENT
Start: 2023-11-16 | End: 2023-11-16 | Stop reason: HOSPADM

## 2023-11-16 RX ORDER — SODIUM CHLORIDE 450 MG/100ML
INJECTION, SOLUTION INTRAVENOUS CONTINUOUS
Status: DISCONTINUED | OUTPATIENT
Start: 2023-11-16 | End: 2023-11-16 | Stop reason: HOSPADM

## 2023-11-16 RX ORDER — FENTANYL CITRATE 50 UG/ML
INJECTION, SOLUTION INTRAMUSCULAR; INTRAVENOUS
Status: DISCONTINUED | OUTPATIENT
Start: 2023-11-16 | End: 2023-11-16 | Stop reason: HOSPADM

## 2023-11-16 RX ORDER — ONDANSETRON 4 MG/1
8 TABLET, ORALLY DISINTEGRATING ORAL EVERY 8 HOURS PRN
Status: DISCONTINUED | OUTPATIENT
Start: 2023-11-16 | End: 2023-11-16 | Stop reason: HOSPADM

## 2023-11-16 RX ORDER — MIDAZOLAM HYDROCHLORIDE 1 MG/ML
INJECTION INTRAMUSCULAR; INTRAVENOUS
Status: DISCONTINUED | OUTPATIENT
Start: 2023-11-16 | End: 2023-11-16 | Stop reason: HOSPADM

## 2023-11-16 RX ORDER — SODIUM CHLORIDE 9 MG/ML
INJECTION, SOLUTION INTRAVENOUS
Status: DISCONTINUED | OUTPATIENT
Start: 2023-11-16 | End: 2023-11-16 | Stop reason: HOSPADM

## 2023-11-16 RX ORDER — DIAZEPAM 5 MG/1
5 TABLET ORAL
Status: DISCONTINUED | OUTPATIENT
Start: 2023-11-16 | End: 2023-11-16 | Stop reason: HOSPADM

## 2023-11-16 RX ORDER — DIPHENHYDRAMINE HCL 25 MG
50 CAPSULE ORAL
Status: DISCONTINUED | OUTPATIENT
Start: 2023-11-16 | End: 2023-11-16 | Stop reason: HOSPADM

## 2023-11-16 RX ORDER — ACETAMINOPHEN 325 MG/1
650 TABLET ORAL EVERY 4 HOURS PRN
Status: DISCONTINUED | OUTPATIENT
Start: 2023-11-16 | End: 2023-11-16 | Stop reason: HOSPADM

## 2023-11-16 NOTE — DISCHARGE SUMMARY
Camilo Louis was admitted for elective left heart catheterization which was performed without complication.  Please see the op report for details.  He had an uncomplicated post catheterization course.  I feel he is now reached maximum hospital benefit and is ready for discharge home.      Impression:  Noncardiac chest pain     Plan:    1. Risk factor modification.      2. Evaluate noncardiac chest pain as an outpatient with PCP.  Follow up with Cardiology as needed.

## 2023-11-16 NOTE — Clinical Note
No new care gaps identified.  [facility] Embedded Care Gaps. Reference number: [id number]. 7/13/2022   12:49:02 PM CDT   The patient was draped.

## 2023-11-16 NOTE — Clinical Note
Bedside report given to ERICKA Flannery in room 422, wife is at bedside. Pt is awake, denies any pain, no other complaints at this time. Right groin site is clean dry and intact, right DP palpable. See vs in flowsheets

## 2023-11-16 NOTE — NURSING
Pt rc'd to the unit via hospital bed from cath lab, respirations even and unlabored. Dressing to right groin noted to be clean, dry and intact. Pulse palpable to rle. VS WNL, pt noted to be aaox4. Pt and spouse aware that the HOB of the bed has to be flat for the first two hours and then can be lifted to a max of a 30 degree angle. Will resume care and monitor.

## 2023-11-16 NOTE — PLAN OF CARE
Problem: Adult Inpatient Plan of Care  Goal: Plan of Care Review  11/16/2023 1731 by Alma Delia Jain RN  Outcome: Ongoing, Progressing  11/16/2023 1731 by Alma Delia Jain RN  Outcome: Ongoing, Progressing  Goal: Patient-Specific Goal (Individualized)  11/16/2023 1731 by Alma Delia Jain RN  Outcome: Ongoing, Progressing  11/16/2023 1731 by Alma Delia Jain RN  Outcome: Ongoing, Progressing  Goal: Absence of Hospital-Acquired Illness or Injury  11/16/2023 1731 by Alma Delia Jain RN  Outcome: Ongoing, Progressing  11/16/2023 1731 by Alma Delia Jain RN  Outcome: Ongoing, Progressing  Goal: Optimal Comfort and Wellbeing  11/16/2023 1731 by Alma Delia Jain RN  Outcome: Ongoing, Progressing  11/16/2023 1731 by Alma Delia Jain RN  Outcome: Ongoing, Progressing  Goal: Readiness for Transition of Care  Outcome: Ongoing, Progressing     Problem: Fall Injury Risk  Goal: Absence of Fall and Fall-Related Injury  Outcome: Ongoing, Progressing

## 2023-11-17 LAB — CATH EF QUANTITATIVE: 60 %

## 2023-11-17 NOTE — NURSING
Patient given discharge papers, assessed rt groin, no hematoma/bleeding noted, site stable. Pedal pulse palpable. Skin w/d no discoloration. Pt has appointment with primary and instruction to follow-up with cardiologist as needed.instructed keep incision site clean and dry. No tub baths for 3-5 days no heavy lifting over 10lbs

## 2024-03-22 ENCOUNTER — PATIENT MESSAGE (OUTPATIENT)
Dept: ADMINISTRATIVE | Facility: HOSPITAL | Age: 64
End: 2024-03-22

## 2024-03-24 DIAGNOSIS — Z12.11 SCREENING FOR COLON CANCER: ICD-10-CM

## 2024-04-29 ENCOUNTER — OFFICE VISIT (OUTPATIENT)
Dept: PRIMARY CARE CLINIC | Facility: CLINIC | Age: 64
End: 2024-04-29
Payer: COMMERCIAL

## 2024-04-29 VITALS
RESPIRATION RATE: 18 BRPM | DIASTOLIC BLOOD PRESSURE: 90 MMHG | HEIGHT: 73 IN | HEART RATE: 56 BPM | SYSTOLIC BLOOD PRESSURE: 142 MMHG | BODY MASS INDEX: 40.82 KG/M2 | OXYGEN SATURATION: 97 % | WEIGHT: 308 LBS

## 2024-04-29 DIAGNOSIS — J44.9 CHRONIC OBSTRUCTIVE PULMONARY DISEASE, UNSPECIFIED COPD TYPE: Primary | ICD-10-CM

## 2024-04-29 DIAGNOSIS — M19.90 OSTEOARTHRITIS, UNSPECIFIED OSTEOARTHRITIS TYPE, UNSPECIFIED SITE: ICD-10-CM

## 2024-04-29 DIAGNOSIS — I10 HYPERTENSION, UNSPECIFIED TYPE: ICD-10-CM

## 2024-04-29 DIAGNOSIS — F17.200 SMOKER: ICD-10-CM

## 2024-04-29 DIAGNOSIS — R94.31 NONSPECIFIC ABNORMAL ELECTROCARDIOGRAM (ECG) (EKG): ICD-10-CM

## 2024-04-29 PROCEDURE — 99214 OFFICE O/P EST MOD 30 MIN: CPT | Mod: ,,, | Performed by: FAMILY MEDICINE

## 2024-04-29 PROCEDURE — 4010F ACE/ARB THERAPY RXD/TAKEN: CPT | Mod: ,,, | Performed by: FAMILY MEDICINE

## 2024-04-29 RX ORDER — AZITHROMYCIN 250 MG/1
250 TABLET, FILM COATED ORAL
COMMUNITY
Start: 2024-04-25

## 2024-04-29 NOTE — PROGRESS NOTES
Subjective:      Patient ID: Camilo Olivera is a 63 y.o. male.    Chief Complaint: check spots on back that itch and Hypertension    Camilo Olivera a 63 y.o. male presents for follow up on all regular problems which are reviewed and discussed.   2 brothers with cancer all tobacco related pt now vaping was a smoker wants skin checked  Problem List Items Addressed This Visit          Pulmonary    COPD (chronic obstructive pulmonary disease) - Primary       Cardiac/Vascular    HTN (hypertension)    RESOLVED: Nonspecific abnormal electrocardiogram (ECG) (EKG)       Orthopedic    DJD (degenerative joint disease)       Other    Smoker    Relevant Orders    CT Chest Lung Screening Low Dose       Past Medical History:  No past medical history on file.  Past Surgical History:   Procedure Laterality Date    HERNIA REPAIR  1990's    Umbilical    LEFT HEART CATHETERIZATION Right 11/16/2023    Procedure: Left heart cath;  Surgeon: Camilo Schwab MD;  Location: [facility] CATH LAB;  Service: Cardiology;  Laterality: Right;  possible right radial     Review of patient's allergies indicates:  No Known Allergies  Current Outpatient Medications on File Prior to Visit   Medication Sig Dispense Refill    albuterol (PROVENTIL/VENTOLIN HFA) 90 mcg/actuation inhaler Inhale 2 puffs into the lungs 4 (four) times daily. Rescue 18 g 3    allopurinoL (ZYLOPRIM) 300 MG tablet Take 1 tablet (300 mg total) by mouth once daily. 90 tablet 3    amLODIPine (NORVASC) 5 MG tablet Take 1 tablet (5 mg total) by mouth once daily. 30 tablet 11    aspirin (ECOTRIN) 81 MG EC tablet Take 1 tablet (81 mg total) by mouth once daily. 90 tablet 3    azithromycin (Z-LUCY) 250 MG tablet Take 250 mg by mouth.      beta-carotene,A,-vits C,E/mins (OCUVITE ORAL) Take by mouth.      bisoprolol-hydrochlorothiazide 5-6.25 mg (ZIAC) 5-6.25 mg Tab Take 1 tablet by mouth once daily. 90 tablet 3    cetirizine (ZYRTEC) 10 MG tablet Take 10 mg by mouth once daily.       cyclobenzaprine (FLEXERIL) 10 MG tablet Take 10 mg by mouth 2 (two) times daily as needed.      folic acid/multivit-min/lutein (CENTRUM SILVER ORAL) Take 1 tablet by mouth Daily.      lisinopriL (PRINIVIL,ZESTRIL) 20 MG tablet Take 1 tablet (20 mg total) by mouth once daily. 90 tablet 3    meloxicam (MOBIC) 7.5 MG tablet Take 1 tablet (7.5 mg total) by mouth once daily. 90 tablet 3    vitamin D (VITAMIN D3) 1000 units Tab Take 1,000 Units by mouth once daily.      [DISCONTINUED] fluticasone-salmeterol diskus inhaler 250-50 mcg Inhale 1 puff into the lungs 2 (two) times daily. Controller 3 each 4     No current facility-administered medications on file prior to visit.     Social History     Socioeconomic History    Marital status:    Tobacco Use    Smoking status: Former     Current packs/day: 0.00     Average packs/day: 1 pack/day for 42.7 years (42.7 ttl pk-yrs)     Types: Cigarettes, Vaping with nicotine     Start date: 1980     Quit date: 9/10/2022     Years since quittin.6    Smokeless tobacco: Never    Tobacco comments:     I  like  smoking   Substance and Sexual Activity    Alcohol use: Yes     Alcohol/week: 12.0 standard drinks of alcohol     Types: 12 Drinks containing 0.5 oz of alcohol per week    Drug use: Never    Sexual activity: Yes     Partners: Female     Birth control/protection: None     Social Determinants of Health     Financial Resource Strain: Low Risk  (2024)    Overall Financial Resource Strain (CARDIA)     Difficulty of Paying Living Expenses: Not very hard   Food Insecurity: Food Insecurity Present (2024)    Hunger Vital Sign     Worried About Running Out of Food in the Last Year: Sometimes true     Ran Out of Food in the Last Year: Never true   Transportation Needs: No Transportation Needs (2024)    PRAPARE - Transportation     Lack of Transportation (Medical): No     Lack of Transportation (Non-Medical): No   Physical Activity: Insufficiently Active  "(4/26/2024)    Exercise Vital Sign     Days of Exercise per Week: 2 days     Minutes of Exercise per Session: 10 min   Stress: No Stress Concern Present (4/26/2024)    [employer] - Occupational Stress Questionnaire     Feeling of Stress : Only a little   Social Connections: Unknown (4/26/2024)    Social Connection and Isolation Panel [NHANES]     Frequency of Communication with Friends and Family: More than three times a week     Frequency of Social Gatherings with Friends and Family: Once a week     Active Member of Clubs or Organizations: Yes     Attends Club or Organization Meetings: More than 4 times per year     Marital Status:    Housing Stability: Unknown (4/26/2024)    Housing Stability Vital Sign     Unable to Pay for Housing in the Last Year: No     Family History   Problem Relation Name Age of Onset    Hypertension Mother Mom     Cancer Maternal Grandmother Big Mama         Breast    Arthritis Maternal Aunt Aunt Maria Isabel         Hands    COPD Paternal Uncle Uncle Zhou     Heart disease Brother [place]         Heart valve    Vision loss Paternal Aunt Aunt Mitty         Glocoma       Review of Systems   Constitutional: Negative.    HENT:  Negative for congestion, ear pain, nosebleeds and trouble swallowing.    Eyes:  Negative for pain and itching.   Respiratory:  Negative for chest tightness.    Cardiovascular:  Negative for chest pain.   Gastrointestinal:  Negative for abdominal distention.   Endocrine: Negative for cold intolerance and heat intolerance.   Genitourinary:  Negative for difficulty urinating.   Musculoskeletal:  Negative for arthralgias.   Skin:  Negative for color change, pallor, rash and wound.   Neurological:  Negative for dizziness.       Objective:     BP (!) 142/90 (BP Location: Left arm, Patient Position: Sitting, BP Method: Large (Manual))   Pulse (!) 56   Resp 18   Ht 6' 1" (1.854 m)   Wt (!) 139.7 kg (308 lb)   SpO2 97%   BMI 40.64 kg/m² "     Physical Exam  Constitutional:       Appearance: Normal appearance. He is obese.   HENT:      Head: Normocephalic and atraumatic.      Right Ear: External ear normal.      Left Ear: External ear normal.      Nose: Nose normal.      Mouth/Throat:      Mouth: Mucous membranes are moist.      Pharynx: Oropharynx is clear.   Eyes:      Pupils: Pupils are equal, round, and reactive to light.   Cardiovascular:      Rate and Rhythm: Normal rate and regular rhythm.      Heart sounds: Normal heart sounds. No murmur heard.     No gallop.   Pulmonary:      Effort: Pulmonary effort is normal. No respiratory distress.      Breath sounds: Normal breath sounds. No wheezing or rales.   Abdominal:      Palpations: Abdomen is soft.   Musculoskeletal:         General: Normal range of motion.      Cervical back: Normal range of motion and neck supple.   Skin:     General: Skin is warm and dry.      Coloration: Skin is not jaundiced or pale.      Findings: Lesion present. No bruising, erythema or rash.   Neurological:      General: No focal deficit present.      Mental Status: He is alert.   Psychiatric:         Mood and Affect: Mood normal.         Behavior: Behavior normal.         Thought Content: Thought content normal.         Judgment: Judgment normal.         1. Chronic obstructive pulmonary disease, unspecified COPD type    2. Smoker    3. Hypertension, unspecified type    4. Nonspecific abnormal electrocardiogram (ECG) (EKG)    5. Osteoarthritis, unspecified osteoarthritis type, unspecified site        Plan:     Problem List Items Addressed This Visit          Pulmonary    COPD (chronic obstructive pulmonary disease) - Primary       Cardiac/Vascular    HTN (hypertension)    RESOLVED: Nonspecific abnormal electrocardiogram (ECG) (EKG)       Orthopedic    DJD (degenerative joint disease)       Other    Smoker    Relevant Orders    CT Chest Lung Screening Low Dose     No follow-ups on file.  Ct screen [last was 22]  Fu  after    I am having Camilo Olivera maintain his vitamin D, folic acid/multivit-min/lutein (CENTRUM SILVER ORAL), (beta-carotene,A,-vits C,E/mins (OCUVITE ORAL)), cetirizine, meloxicam, allopurinoL, bisoprolol-hydrochlorothiazide 5-6.25 mg, aspirin, albuterol, lisinopriL, amLODIPine, cyclobenzaprine, and azithromycin.    Camilo was seen today for check spots on back that itch and hypertension.    Diagnoses and all orders for this visit:    Chronic obstructive pulmonary disease, unspecified COPD type    Smoker  -     CT Chest Lung Screening Low Dose; Future    Hypertension, unspecified type    Nonspecific abnormal electrocardiogram (ECG) (EKG)    Osteoarthritis, unspecified osteoarthritis type, unspecified site         [unfilled]  Orders Placed This Encounter   Procedures    CT Chest Lung Screening Low Dose     Standing Status:   Future     Standing Expiration Date:   4/29/2025     Order Specific Question:   Is there documentation of shared decision making for this lung screening exam?     Answer:   Yes     Order Specific Question:   Is the patient a current smoker?     Answer:   No     Order Specific Question:   How many years has the patient quit smoking?     Answer:   3     Order Specific Question:   Does the patient have a 20-pack/year or greater smoke history?     Answer:   Yes     Order Specific Question:   Is the patient between the ages 50-80 years old?     Answer:   Yes     Order Specific Question:   Does the patient show any signs or symptoms of lung cancer?     Answer:   No     Order Specific Question:   Is this the first (baseline) CT or an annual exam?     Answer:   Annual [2]     Order Specific Question:   May the Radiologist modify the order per protocol to meet the clinical needs of the patient?     Answer:   Yes     Order Specific Question:   Is this a low dose screening chest CT?     Answer:   Yes

## 2024-04-30 DIAGNOSIS — Z12.11 COLON CANCER SCREENING: Primary | ICD-10-CM

## 2024-05-29 LAB — NONINV COLON CA DNA+OCC BLD SCRN STL QL: POSITIVE

## 2024-05-31 DIAGNOSIS — R19.5 POSITIVE COLORECTAL CANCER SCREENING USING COLOGUARD TEST: Primary | ICD-10-CM

## 2024-06-03 ENCOUNTER — TELEPHONE (OUTPATIENT)
Dept: PRIMARY CARE CLINIC | Facility: CLINIC | Age: 64
End: 2024-06-03
Payer: COMMERCIAL

## 2024-06-03 NOTE — TELEPHONE ENCOUNTER
----- Message from Alex Melendez III, DO sent at 5/31/2024 10:47 AM CDT -----  + test  order for gi placed

## 2024-07-15 RX ORDER — ALBUTEROL SULFATE 90 UG/1
AEROSOL, METERED RESPIRATORY (INHALATION)
Qty: 6.7 G | Refills: 12 | Status: SHIPPED | OUTPATIENT
Start: 2024-07-15

## 2024-08-08 ENCOUNTER — OFFICE VISIT (OUTPATIENT)
Dept: GASTROENTEROLOGY | Facility: CLINIC | Age: 64
End: 2024-08-08
Attending: FAMILY MEDICINE
Payer: COMMERCIAL

## 2024-08-08 ENCOUNTER — HOSPITAL ENCOUNTER (OUTPATIENT)
Dept: RADIOLOGY | Facility: HOSPITAL | Age: 64
Discharge: HOME | End: 2024-08-08
Attending: FAMILY MEDICINE
Payer: COMMERCIAL

## 2024-08-08 ENCOUNTER — OFFICE VISIT (OUTPATIENT)
Dept: PULMONOLOGY | Facility: CLINIC | Age: 64
End: 2024-08-08
Payer: COMMERCIAL

## 2024-08-08 VITALS
OXYGEN SATURATION: 93 % | DIASTOLIC BLOOD PRESSURE: 71 MMHG | WEIGHT: 311.38 LBS | SYSTOLIC BLOOD PRESSURE: 139 MMHG | HEIGHT: 73 IN | RESPIRATION RATE: 20 BRPM | BODY MASS INDEX: 41.27 KG/M2 | HEART RATE: 51 BPM

## 2024-08-08 VITALS — WEIGHT: 310 LBS | HEIGHT: 73 IN | BODY MASS INDEX: 41.08 KG/M2

## 2024-08-08 VITALS
WEIGHT: 312.63 LBS | HEART RATE: 57 BPM | OXYGEN SATURATION: 93 % | SYSTOLIC BLOOD PRESSURE: 136 MMHG | HEIGHT: 73 IN | DIASTOLIC BLOOD PRESSURE: 76 MMHG | BODY MASS INDEX: 41.43 KG/M2

## 2024-08-08 DIAGNOSIS — R19.5 POSITIVE COLORECTAL CANCER SCREENING USING COLOGUARD TEST: ICD-10-CM

## 2024-08-08 DIAGNOSIS — J43.2 CENTRILOBULAR EMPHYSEMA: Primary | ICD-10-CM

## 2024-08-08 DIAGNOSIS — F17.200 SMOKER: ICD-10-CM

## 2024-08-08 DIAGNOSIS — F17.200 NICOTINE DEPENDENCE WITH CURRENT USE: ICD-10-CM

## 2024-08-08 PROCEDURE — 71271 CT THORAX LUNG CANCER SCR C-: CPT | Mod: TC

## 2024-08-08 PROCEDURE — 99999 PR PBB SHADOW E&M-EST. PATIENT-LVL IV: CPT | Mod: PBBFAC,,,

## 2024-08-08 PROCEDURE — 99214 OFFICE O/P EST MOD 30 MIN: CPT | Mod: PBBFAC,25

## 2024-08-08 PROCEDURE — 99999 PR PBB SHADOW E&M-EST. PATIENT-LVL IV: CPT | Mod: PBBFAC,,, | Performed by: STUDENT IN AN ORGANIZED HEALTH CARE EDUCATION/TRAINING PROGRAM

## 2024-08-08 PROCEDURE — 99214 OFFICE O/P EST MOD 30 MIN: CPT | Mod: PBBFAC,25 | Performed by: STUDENT IN AN ORGANIZED HEALTH CARE EDUCATION/TRAINING PROGRAM

## 2024-08-08 RX ORDER — LORAZEPAM 0.5 MG/1
0.5 TABLET ORAL EVERY 6 HOURS PRN
Qty: 3 TABLET | Refills: 0 | Status: SHIPPED | OUTPATIENT
Start: 2024-08-08 | End: 2024-09-07

## 2024-08-08 RX ORDER — LORAZEPAM 1 MG/1
1 TABLET ORAL EVERY 4 HOURS PRN
Qty: 2 TABLET | Refills: 0 | Status: CANCELLED | OUTPATIENT
Start: 2024-08-08 | End: 2024-09-07

## 2024-08-08 RX ORDER — POLYETHYLENE GLYCOL 3350, SODIUM SULFATE ANHYDROUS, SODIUM BICARBONATE, SODIUM CHLORIDE, POTASSIUM CHLORIDE 236; 22.74; 6.74; 5.86; 2.97 G/4L; G/4L; G/4L; G/4L; G/4L
4 POWDER, FOR SOLUTION ORAL ONCE
Qty: 4000 ML | Refills: 0 | Status: SHIPPED | OUTPATIENT
Start: 2024-08-08 | End: 2024-08-08

## 2024-08-08 RX ORDER — INDOMETHACIN 50 MG/1
50 CAPSULE ORAL 3 TIMES DAILY
COMMUNITY
Start: 2024-07-10

## 2024-08-08 RX ORDER — COLCHICINE 0.6 MG/1
TABLET ORAL
COMMUNITY
Start: 2024-06-13

## 2024-08-08 RX ORDER — BUDESONIDE, GLYCOPYRROLATE, AND FORMOTEROL FUMARATE 160; 9; 4.8 UG/1; UG/1; UG/1
2 AEROSOL, METERED RESPIRATORY (INHALATION) 2 TIMES DAILY
COMMUNITY
Start: 2024-08-05

## 2024-08-12 RX ORDER — BISOPROLOL FUMARATE AND HYDROCHLOROTHIAZIDE 5; 6.25 MG/1; MG/1
1 TABLET ORAL DAILY
Qty: 90 TABLET | Refills: 3 | Status: SHIPPED | OUTPATIENT
Start: 2024-08-12

## 2024-08-12 RX ORDER — ALLOPURINOL 300 MG/1
300 TABLET ORAL DAILY
Qty: 90 TABLET | Refills: 3 | Status: SHIPPED | OUTPATIENT
Start: 2024-08-12

## 2024-08-12 RX ORDER — MELOXICAM 7.5 MG/1
7.5 TABLET ORAL DAILY
Qty: 90 TABLET | Refills: 3 | Status: SHIPPED | OUTPATIENT
Start: 2024-08-12

## 2024-09-05 ENCOUNTER — TELEPHONE (OUTPATIENT)
Dept: PULMONOLOGY | Facility: CLINIC | Age: 64
End: 2024-09-05
Payer: COMMERCIAL

## 2024-09-05 NOTE — TELEPHONE ENCOUNTER
Spoke to the patient voiced will return from out of town on 9/24 need to RS appointments RS for 9/27 at 9am with following.

## 2024-10-30 RX ORDER — AMLODIPINE BESYLATE 5 MG/1
5 TABLET ORAL DAILY
Qty: 30 TABLET | Refills: 11 | Status: SHIPPED | OUTPATIENT
Start: 2024-10-30 | End: 2025-10-30

## 2024-10-30 RX ORDER — LISINOPRIL 20 MG/1
20 TABLET ORAL DAILY
Qty: 90 TABLET | Refills: 3 | Status: SHIPPED | OUTPATIENT
Start: 2024-10-30 | End: 2025-10-30

## 2025-06-13 ENCOUNTER — TELEPHONE (OUTPATIENT)
Dept: PULMONOLOGY | Facility: CLINIC | Age: 65
End: 2025-06-13
Payer: COMMERCIAL

## 2025-06-13 NOTE — TELEPHONE ENCOUNTER
Spoke to shelli and made aware Dr. Olmos stated she does not see any indication ordering a HSCT at this time, pt has also failed to follow up on his appts. Did voice there is a CT from 08/24 available for them to look at. Shelli stated she will let Dr. Busby know, no further questions at this time.

## 2025-06-13 NOTE — TELEPHONE ENCOUNTER
Copied from CRM #[id number]. Topic: General Inquiry - Patient Advice  >> Jun 13, 2025  8:55 AM Antonette wrote:  Who Called: Shelli with Dr. Busby's office    Caller is requesting assistance/information from provider's office.    Shelli is calling to speak to nurse, Shelli states Dr. Busby is wanting to order a High Resolution CT and wanted  to see if Dr. Olmos wanted to order this for him to do at [facility] so pt didn't have to drive all the way to [place].. Phone # [phone] Shelli does not have voice mail asks if she  does not answer please call back in a few min.    Preferred Method of Contact: Phone Call  Patient's Preferred Phone Number on File: [phone]   Best Call Back Number, if different:  Additional Information:

## 2025-07-26 ENCOUNTER — HOSPITAL ENCOUNTER (EMERGENCY)
Facility: HOSPITAL | Age: 65
Discharge: HOME | End: 2025-07-26
Payer: COMMERCIAL

## 2025-07-26 VITALS
HEIGHT: 72 IN | BODY MASS INDEX: 41.72 KG/M2 | HEART RATE: 68 BPM | OXYGEN SATURATION: 92 % | RESPIRATION RATE: 22 BRPM | SYSTOLIC BLOOD PRESSURE: 141 MMHG | WEIGHT: 308 LBS | DIASTOLIC BLOOD PRESSURE: 77 MMHG | TEMPERATURE: 98 F

## 2025-07-26 DIAGNOSIS — M54.31 SCIATICA OF RIGHT SIDE: Primary | ICD-10-CM

## 2025-07-26 PROCEDURE — 96372 THER/PROPH/DIAG INJ SC/IM: CPT | Performed by: PEDIATRICS

## 2025-07-26 PROCEDURE — 63600175 PHARM REV CODE 636 W HCPCS: Performed by: PEDIATRICS

## 2025-07-26 PROCEDURE — 99284 EMERGENCY DEPT VISIT MOD MDM: CPT | Mod: ,,, | Performed by: PEDIATRICS

## 2025-07-26 PROCEDURE — 99284 EMERGENCY DEPT VISIT MOD MDM: CPT | Mod: 25

## 2025-07-26 RX ORDER — OLMESARTAN MEDOXOMIL AND HYDROCHLOROTHIAZIDE 40/25 40; 25 MG/1; MG/1
TABLET ORAL
COMMUNITY
Start: 2025-03-26

## 2025-07-26 RX ORDER — SILDENAFIL 50 MG/1
TABLET, FILM COATED ORAL
COMMUNITY
Start: 2025-07-22

## 2025-07-26 RX ORDER — ORPHENADRINE CITRATE 30 MG/ML
30 INJECTION INTRAMUSCULAR; INTRAVENOUS
Status: COMPLETED | OUTPATIENT
Start: 2025-07-26 | End: 2025-07-26

## 2025-07-26 RX ORDER — TIZANIDINE 4 MG/1
4 TABLET ORAL 3 TIMES DAILY
COMMUNITY
Start: 2025-07-22

## 2025-07-26 RX ORDER — HYDROXYCHLOROQUINE SULFATE 200 MG/1
TABLET, FILM COATED ORAL
COMMUNITY
Start: 2025-05-09

## 2025-07-26 RX ORDER — MELOXICAM 15 MG/1
TABLET ORAL
COMMUNITY
Start: 2025-04-09

## 2025-07-26 RX ORDER — OLMESARTAN MEDOXOMIL 40 MG/1
40 TABLET ORAL
COMMUNITY
Start: 2025-06-13

## 2025-07-26 RX ORDER — ROSUVASTATIN CALCIUM 5 MG/1
5 TABLET, COATED ORAL
COMMUNITY
Start: 2025-07-02

## 2025-07-26 RX ORDER — NAPROXEN 500 MG/1
TABLET ORAL
COMMUNITY
Start: 2025-06-12

## 2025-07-26 RX ORDER — KETOROLAC TROMETHAMINE 30 MG/ML
30 INJECTION, SOLUTION INTRAMUSCULAR; INTRAVENOUS
Status: COMPLETED | OUTPATIENT
Start: 2025-07-26 | End: 2025-07-26

## 2025-07-26 RX ORDER — DEXAMETHASONE SODIUM PHOSPHATE 4 MG/ML
8 INJECTION, SOLUTION INTRA-ARTICULAR; INTRALESIONAL; INTRAMUSCULAR; INTRAVENOUS; SOFT TISSUE
Status: COMPLETED | OUTPATIENT
Start: 2025-07-26 | End: 2025-07-26

## 2025-07-26 RX ORDER — IPRATROPIUM BROMIDE AND ALBUTEROL SULFATE 2.5; .5 MG/3ML; MG/3ML
SOLUTION RESPIRATORY (INHALATION)
COMMUNITY
Start: 2025-07-21

## 2025-07-26 RX ADMIN — DEXAMETHASONE SODIUM PHOSPHATE 8 MG: 4 INJECTION, SOLUTION INTRA-ARTICULAR; INTRALESIONAL; INTRAMUSCULAR; INTRAVENOUS; SOFT TISSUE at 10:07

## 2025-07-26 RX ADMIN — KETOROLAC TROMETHAMINE 30 MG: 30 INJECTION, SOLUTION INTRAMUSCULAR at 10:07

## 2025-07-26 RX ADMIN — ORPHENADRINE CITRATE 30 MG: 60 INJECTION INTRAMUSCULAR; INTRAVENOUS at 10:07

## 2025-07-26 NOTE — DISCHARGE INSTRUCTIONS
Based on patient medication list he already has to muscle rock relaxers prescribed tizanidine and Flexeril.  He should continue to take these as prescribed.      He can take over-the-counter ibuprofen to help with discomfort.      Heating pads as well as the stretches above may add benefit.

## 2025-07-26 NOTE — ED NOTES
Pt states meds have helped some - pt aware md not prescribing antibiotics- pt with no cough , fever, or respiratory distress noted- pt and wife understand md - pt instructed to follow up with pmd for further medications and treatments

## 2025-07-26 NOTE — ED PROVIDER NOTES
Encounter Date: 7/26/2025       History     Chief Complaint   Patient presents with    Back Pain     64-year-old male comes to the emergency room with his wife.  Reports that approximately 3 weeks ago he fell and injured his back.  Seen by primary care given steroids in muscle relaxers with improvement.  Reports 3 days ago he woke up and had some soreness in the back and it has gradually worsened.  To now that he has a sensation going down his leg on the lateral side can shoot to the medial or whole bottom of the foot feeling numb.  Denies any other injury to the lower back.  Unsure if he aggravated or how he aggravated it.  Denies any loss of bowel or bladder.  No fevers.      Review of patient's allergies indicates:  No Known Allergies  Past Medical History:   Diagnosis Date    Arthritis     COPD (chronic obstructive pulmonary disease)     Hypertension      Past Surgical History:   Procedure Laterality Date    HERNIA REPAIR  1990's    Umbilical    LEFT HEART CATHETERIZATION Right 11/16/2023    Procedure: Left heart cath;  Surgeon: Camilo Schwab MD;  Location: [facility] CATH LAB;  Service: Cardiology;  Laterality: Right;  possible right radial     Family History   Problem Relation Name Age of Onset    Hypertension Mother Mom     Cancer Maternal Grandmother Big Mama         Breast    Arthritis Maternal Aunt Aunt Maria Isabel         Hands    COPD Paternal Uncle Uncle Zhou     Heart disease Brother [place]         Heart valve    Vision loss Paternal Aunt Aunt Mitty         Glocoma     Social History[1]  Review of Systems   Constitutional:  Negative for fever.   Cardiovascular:  Negative for chest pain and leg swelling.   Gastrointestinal:  Negative for abdominal distention.   Genitourinary:  Negative for enuresis.   Musculoskeletal:  Positive for back pain and gait problem. Negative for joint swelling.   All other systems reviewed and are negative.      Physical Exam     Initial Vitals [07/26/25 0933]   BP Pulse Resp  Temp SpO2   (!) 141/77 68 (!) 22 98.1 °F (36.7 °C) (!) 92 %      MAP       --         Physical Exam    Vitals reviewed.  Constitutional: He appears well-developed and well-nourished. He is Obese . He is cooperative. No distress.   Sitting in a wheelchair leaning to the left hip.   Cardiovascular:  Normal rate, regular rhythm, normal heart sounds and intact distal pulses.           No murmur heard.  Pulmonary/Chest: Breath sounds normal. No respiratory distress.     Neurological: He is alert and oriented to person, place, and time. He has normal strength. GCS score is 15. GCS eye subscore is 4. GCS verbal subscore is 5. GCS motor subscore is 6.   Skin: Skin is warm and dry. Capillary refill takes less than 2 seconds.   Psychiatric: He has a normal mood and affect. His behavior is normal. Judgment and thought content normal.         Medical Screening Exam   See Full Note    ED Course   Procedures  Labs Reviewed - No data to display       Imaging Results    None          Medications   ketorolac injection 30 mg (30 mg Intramuscular Given 7/26/25 1005)   orphenadrine injection 30 mg (30 mg Intramuscular Given 7/26/25 1006)   dexAMETHasone injection 8 mg (8 mg Intramuscular Given 7/26/25 1004)     Medical Decision Making  64-year-old male with previous diagnosis of right sciatica after a fall approximately 3 weeks ago with improvement in appropriate ambulation.  Unclear about re-injury or aggravation but no recent falls.  Differential includes spasms, sciatica, strain.  Strength those lower extremities are intact.  Range of motions intact.    Risk  Prescription drug management.                                      Clinical Impression:   Final diagnoses:  [M54.31] Sciatica of right side (Primary)        ED Disposition Condition    Discharge Stable          ED Prescriptions    None       Follow-up Information    None              [1]   Social History  Tobacco Use    Smoking status: Former     Current packs/day: 0.00      Average packs/day: 1 pack/day for 42.7 years (42.7 ttl pk-yrs)     Types: Cigarettes, Vaping with nicotine     Start date: 1980     Quit date: 9/10/2022     Years since quittin.8    Smokeless tobacco: Never    Tobacco comments:     I  like  smoking   Vaping Use    Vaping status: Every Day    Substances: Nicotine    Devices: Disposable, Refillable tank   Substance Use Topics    Alcohol use: Yes     Alcohol/week: 12.0 standard drinks of alcohol     Types: 12 Drinks containing 0.5 oz of alcohol per week    Drug use: Never        Roderick Marina MD  25 [phone]

## 2025-07-26 NOTE — ED TRIAGE NOTES
Pt to er via wheelchair with c/o lower back pain- r sciatica pain - pt states he fell 3 weeks ago in his bedroom - states tripped over rug in bedroom - wife states patient had been drinking- pt states he saw his pmd dr toscano - pt states got steroids and muscle relaxer's with improvement - pt denies recent injury in last few days - states pain has come back with numbness to bilateral legs - denies trouble peeing and pooping - pt states he did take muscle relaxer of tizanidine this am - pt has not had any xrays , ct's, or mri's performed

## 2025-07-26 NOTE — ED NOTES
Pt now states he is some better at rest -states pain not really  better but he can move and find a happy medium setting - pt states he want to know if the doctor can give him a prescription for antibiotics- pt states he has bronchitis

## 2025-08-07 ENCOUNTER — TELEPHONE (OUTPATIENT)
Dept: PULMONOLOGY | Facility: CLINIC | Age: 65
End: 2025-08-07
Payer: COMMERCIAL

## 2025-08-07 NOTE — TELEPHONE ENCOUNTER
Copied from CRM #[id number]. Topic: Appointments - Appointment Access  >> Aug 7, 2025  2:20 PM Phoebe wrote:  Who Called:Charline Olivera (Spouse)    Caller is requesting a sooner appointment. [place] and he will be back home 8/27 for 2 weeks. Please call back at the number below. Thanks    When is the first available appointment? 9/18 at 4  Options offered (Virtual Visit, Urgent Care): n/a  Symptoms: COPD / needs CT per rheumatologist in [place]      Preferred Method of Contact: Phone Call  Patient's Preferred Phone Number on File: [phone]

## 2025-08-07 NOTE — TELEPHONE ENCOUNTER
Spoke with patient, did request earlier appt pertaining to rheumatologist inquiring about patient needing a high res chest CT due to findings during his appt today. Also stated getting earlier appt set up. Did voice that Dr. Olmos's scheduled is booked out until that mid September spot and we normally would have to work patient in but can send her concerns and follow up with family member and pt. Aware and acknowledged at this time

## 2025-09-02 ENCOUNTER — OFFICE VISIT (OUTPATIENT)
Dept: PULMONOLOGY | Facility: CLINIC | Age: 65
End: 2025-09-02
Payer: COMMERCIAL

## 2025-09-02 VITALS
DIASTOLIC BLOOD PRESSURE: 60 MMHG | BODY MASS INDEX: 41.72 KG/M2 | HEIGHT: 72 IN | HEART RATE: 60 BPM | WEIGHT: 308 LBS | RESPIRATION RATE: 18 BRPM | SYSTOLIC BLOOD PRESSURE: 124 MMHG | OXYGEN SATURATION: 96 %

## 2025-09-02 DIAGNOSIS — J44.1 ACUTE EXACERBATION OF CHRONIC OBSTRUCTIVE PULMONARY DISEASE (COPD): ICD-10-CM

## 2025-09-02 DIAGNOSIS — J43.2 CENTRILOBULAR EMPHYSEMA: ICD-10-CM

## 2025-09-02 DIAGNOSIS — R93.89 ABNORMAL CXR: Primary | ICD-10-CM

## 2025-09-02 DIAGNOSIS — R06.02 SHORTNESS OF BREATH: ICD-10-CM

## 2025-09-02 PROCEDURE — 96372 THER/PROPH/DIAG INJ SC/IM: CPT | Mod: PBBFAC | Performed by: STUDENT IN AN ORGANIZED HEALTH CARE EDUCATION/TRAINING PROGRAM

## 2025-09-02 PROCEDURE — 3078F DIAST BP <80 MM HG: CPT | Mod: ,,, | Performed by: STUDENT IN AN ORGANIZED HEALTH CARE EDUCATION/TRAINING PROGRAM

## 2025-09-02 PROCEDURE — 3008F BODY MASS INDEX DOCD: CPT | Mod: ,,, | Performed by: STUDENT IN AN ORGANIZED HEALTH CARE EDUCATION/TRAINING PROGRAM

## 2025-09-02 PROCEDURE — 99999 PR PBB SHADOW E&M-EST. PATIENT-LVL V: CPT | Mod: PBBFAC,,, | Performed by: STUDENT IN AN ORGANIZED HEALTH CARE EDUCATION/TRAINING PROGRAM

## 2025-09-02 PROCEDURE — 99999PBSHW PR PBB SHADOW TECHNICAL ONLY FILED TO HB: Mod: JZ,PBBFAC,,

## 2025-09-02 PROCEDURE — 99214 OFFICE O/P EST MOD 30 MIN: CPT | Mod: S$PBB,25,, | Performed by: STUDENT IN AN ORGANIZED HEALTH CARE EDUCATION/TRAINING PROGRAM

## 2025-09-02 PROCEDURE — 1159F MED LIST DOCD IN RCRD: CPT | Mod: ,,, | Performed by: STUDENT IN AN ORGANIZED HEALTH CARE EDUCATION/TRAINING PROGRAM

## 2025-09-02 PROCEDURE — 4010F ACE/ARB THERAPY RXD/TAKEN: CPT | Mod: ,,, | Performed by: STUDENT IN AN ORGANIZED HEALTH CARE EDUCATION/TRAINING PROGRAM

## 2025-09-02 PROCEDURE — 99215 OFFICE O/P EST HI 40 MIN: CPT | Mod: PBBFAC,25 | Performed by: STUDENT IN AN ORGANIZED HEALTH CARE EDUCATION/TRAINING PROGRAM

## 2025-09-02 PROCEDURE — 1160F RVW MEDS BY RX/DR IN RCRD: CPT | Mod: ,,, | Performed by: STUDENT IN AN ORGANIZED HEALTH CARE EDUCATION/TRAINING PROGRAM

## 2025-09-02 PROCEDURE — 3074F SYST BP LT 130 MM HG: CPT | Mod: ,,, | Performed by: STUDENT IN AN ORGANIZED HEALTH CARE EDUCATION/TRAINING PROGRAM

## 2025-09-02 RX ORDER — METHYLPREDNISOLONE ACETATE 40 MG/ML
40 INJECTION, SUSPENSION INTRA-ARTICULAR; INTRALESIONAL; INTRAMUSCULAR; SOFT TISSUE
Status: DISCONTINUED | OUTPATIENT
Start: 2025-09-02 | End: 2025-09-02

## 2025-09-02 RX ORDER — METOPROLOL SUCCINATE 25 MG/1
25 TABLET, EXTENDED RELEASE ORAL
COMMUNITY
Start: 2025-07-02

## 2025-09-02 RX ORDER — METHYLPREDNISOLONE ACETATE 40 MG/ML
40 INJECTION, SUSPENSION INTRA-ARTICULAR; INTRALESIONAL; INTRAMUSCULAR; SOFT TISSUE
Status: COMPLETED | OUTPATIENT
Start: 2025-09-02 | End: 2025-09-02

## 2025-09-02 RX ADMIN — METHYLPREDNISOLONE ACETATE 40 MG: 40 INJECTION, SUSPENSION INTRA-ARTICULAR; INTRALESIONAL; INTRAMUSCULAR; INTRASYNOVIAL; SOFT TISSUE at 04:09

## (undated) DEVICE — KIT IV START WITH PREVANTICS

## (undated) DEVICE — NDL PERCUTANEOUS ENTRYBSDN 18

## (undated) DEVICE — SOL IRR SOD CHL .9% POUR

## (undated) DEVICE — SET IV PRIMARY

## (undated) DEVICE — TOWEL OR DISP STRL BLUE 4/PK

## (undated) DEVICE — OXISENSOR ADULT DIGIT N/S

## (undated) DEVICE — CATH IV 20G 1.16 IN AUTOGARD

## (undated) DEVICE — ETCO2 NC MICROSTR FEM ST ADLT

## (undated) DEVICE — CLOTH READYPREP 2%CHG 9X10.5IN

## (undated) DEVICE — INTRODUCER CATH 6F 11CM

## (undated) DEVICE — CATH IMPULSE PIGTAIL 6F 110CM

## (undated) DEVICE — CUFF FLEXIPORT BP LONG ADULT

## (undated) DEVICE — GLOVE SURG BIOGEL LATEX SZ 7.5

## (undated) DEVICE — CATH DIAG IMPULSE 6FR FL4

## (undated) DEVICE — SET EXT CATH NONDEHP .8 6.5IN

## (undated) DEVICE — CATH DIAG IMPULSE 6FR FR4

## (undated) DEVICE — DECANTER FLUID TRNSF WHITE 9IN

## (undated) DEVICE — CONTRAST ISOVUE 370 100ML

## (undated) DEVICE — CHLORAPREP 10.5 ML APPLICATOR

## (undated) DEVICE — GLOVE PROTEXIS PI CRM 5.5

## (undated) DEVICE — SET EXTENSION CLEARLINK 2INJ

## (undated) DEVICE — CLIPPER BLADE MOD 4406 (CAREF)

## (undated) DEVICE — Device

## (undated) DEVICE — DRESSING TRANS 4X4 TEGADERM

## (undated) DEVICE — LEADWIRE DL DC EKG 10 PIN

## (undated) DEVICE — PROTECTOR ULNAR NERVE FOAM